# Patient Record
Sex: MALE | Race: WHITE | Employment: OTHER | URBAN - METROPOLITAN AREA
[De-identification: names, ages, dates, MRNs, and addresses within clinical notes are randomized per-mention and may not be internally consistent; named-entity substitution may affect disease eponyms.]

---

## 2022-02-04 ENCOUNTER — TELEPHONE (OUTPATIENT)
Dept: FAMILY MEDICINE CLINIC | Age: 87
End: 2022-02-04

## 2022-02-07 ENCOUNTER — TELEPHONE (OUTPATIENT)
Dept: FAMILY MEDICINE CLINIC | Age: 87
End: 2022-02-07

## 2022-02-07 NOTE — TELEPHONE ENCOUNTER
Authorization to Release Health Information form sent to patient's daughter Luh Neumann via [employer]. This nurse called and informed Ms. Elva Beltran that it has been sent. Will request records from patient's former PCP Dr. Ana Govea. Patient's most recent visit with her was about 3 weeks ago.

## 2022-02-07 NOTE — TELEPHONE ENCOUNTER
Marcela Velázquez called to let you know that she has not received the docusign new patient paperwork yet. She verified her email as:  [email or web address]

## 2022-02-07 NOTE — TELEPHONE ENCOUNTER
Home Based Primary Care & Supportive Services   Phone:  [phone]      Fax:  [phone] [facility], [facility], [address]    Name:  Ruddy Tovar  YOB: 1931    Incoming call from [place] who is inquiring about HBPC services for her 79yo father Facundo Jung. She states that her father recently moved to [place] to live with her. His former PCP is Dr. Thom Summers in [place]. Preliminary Intake Note:     Address:   [address] [place] [id number]    Does patient live within 10 miles of [address] [place] [address] at address listed above?      yes       Distance from [address] [place] [address] office/Travel Time:   0.7 miles/ 3 minutes  Region:  [address] [place] [address] Road:   Medicare A&B with supplement    Does patient qualify based on address and insurance? yes    Region:   Near [facility]    Date of Referral:  2/4/22  Referred By, or How did you hear about our program?   Searched online    Reason for Referral:  Recently moved to [place] to live with daughter    Diagnosis:  Diabetes, orthostatic hypotension with recent falls    Last PCP visit:   1/6/22    Last Hospitalization:   None recent    Nursing Home/Rehab stay in the past year? no    Primary Caregiver:   [place]  Relation to Patient:     Contact Information:  [phone]    Records Needed:   Records requested and received from Dr. Thom Summers    Current Controlled Substances:   Gabapentin 100mg at bedtime    This nurse explained that the [address] [place] [address] team discusses new referrals at our weekly IDG meetings. This nurse will present the referral to the [address] [place] [address] team and will call Ms. Arceo back to notify her of the decision. She voices understanding.                                                                                                                                                                                    Sue Mulligan, JYOTSNAN, RN-BC, [facility] [place]  Referral Navigator, Home Based Primary Care & Supportive Services

## 2022-02-10 ENCOUNTER — TELEPHONE (OUTPATIENT)
Dept: FAMILY MEDICINE CLINIC | Age: 87
End: 2022-02-10

## 2022-02-10 DIAGNOSIS — E11.9 CONTROLLED TYPE 2 DIABETES MELLITUS WITHOUT COMPLICATION, WITHOUT LONG-TERM CURRENT USE OF INSULIN (HCC): Primary | ICD-10-CM

## 2022-02-10 DIAGNOSIS — I95.1 ORTHOSTATIC HYPOTENSION: ICD-10-CM

## 2022-02-10 NOTE — TELEPHONE ENCOUNTER
Radha Reed called to find out the status of the patient's referral to the [facility] program.  She said that she would like the patient to be seen as soon as possible due to the patient experiencing ambulatory difficulties.

## 2022-02-15 ENCOUNTER — TELEPHONE (OUTPATIENT)
Dept: FAMILY MEDICINE CLINIC | Age: 87
End: 2022-02-15

## 2022-02-15 NOTE — TELEPHONE ENCOUNTER
LCSW spoke with pt's dtr, Dee Monge, and scheduled in-home visit to complete HBPC paperwork on Wednesday 2/16 @ 12pm. She works from home and has a break in her day at 12pm to meet with LCSW and sign intake forms on behalf of her father.      DEMARCO Garcia, [facility]    [address]  [address]) [phone]

## 2022-02-16 ENCOUNTER — OFFICE VISIT (OUTPATIENT)
Dept: FAMILY MEDICINE CLINIC | Age: 87
End: 2022-02-16

## 2022-02-16 DIAGNOSIS — Z76.89 ENCOUNTER FOR SOCIAL WORK INTERVENTION: Primary | ICD-10-CM

## 2022-02-16 NOTE — PROGRESS NOTES
Home Based Primary Care   [phone]  Initial Social Work Assessment    Date and Time of Initial In-Home Visit: 2/16/21    Reason for Assessment:  [facility] intake paperwork completion, initial in-home psychosocial assessment    Sources of Information: Pt's dtr, Shiela Villarreal    SOCIAL HISTORY  Relationship Status:   [] Single  []   [] Significant Other  []   [x] /  [] Other:     Living Circumstances: Pt moved in to his daughter's home in [facility] 3 weeks ago. He had previously been living in [address] Dtr moved to [facility] in October 2021. Current/Type of Housing:  [] Public/subsidized housing  [] Apartment, Is there an elevator:   [] Assisted Living  [x] Private House, How many floors: 2    FINANCIAL/INSURANCE  Source of Income:    [x] Social Security   [] SSI   [] Pension   [] Employment Income   [] [address]:   [x] Medicare   [] Medicaid   [x] Private Insurance: [employer] Medicare PPO   [] Other:     Are you having trouble paying for things like rent, food, medications? Not at this time    Is there an agency or person who pays your bills? If yes, who? Pt's dtr is assisting with managing finances    [address]: No concerns    Problem with bed bugs, odors, pests, or pets? Not at this time    Working smoke alarm? [x] Yes [] No    Difficulty getting to exits from the home? There are about 3 steps from front door to the ground level. No challenges noted at this time. SOCIAL SUPPORT ENVIRONMENT  Support System: Pt's dtr, Rox Villafuerte, and her  are pt's primary support, as well as a granddtr who lives in [place] and visits frequently. How often do social supports visit? Frequently, pt lives with dtr and son in law    How do you socialize? When people come to visit and engage with him    Are you involved with any community agencies? Name/Contact: Not at this time    Is or has Adult Protective Services ever been involved? No    In the last 6 months, have you seen a ? Psychiatrist? If yes, they be contacted by [address] team? No    Substance Use:   Tobacco? [] Yes [x] No    Alcohol? [] Yes, How many drinks per week: [x] No   Drugs? [] Yes, which drugs:   [x] No    Do you have an Emergency Call Device system? [] Yes, Which brand? [x] No, Are you interested in obtaining and subscribing to an Emergency Call Device system? No, pt is not left home alone    COGNITIVE/EMOTIONAL   Mental Status: Pt was awake, sitting on the couch watching television when LCSW arrived. He was able to engage in a bit of conversation during today's visit. In the last 6 months, has anyone told you that you are forgetful? Yes    Do you receive help with ADLs? [x] Yes, Who helps you? How many hours/days? Pt has private duty aides during the day  [] No, Do you need help? TRANSPORTATION NEEDS ASSESSMENT  Can you use public transportation? [] Yes [x] No  Do you use transportation services provided by: Managed Care Organization? Community Service Resource? No    EMERGENCY ACTION PLAN  TERESE reviewed the Emergency Action Plan with pt and/or family during this initial in-home Social Work assessment. SW completed Emergency Numbers, reviewed the content areas of Power Loss, Natural Disasters, Clinical Emergencies, Severe Weather, Safety in the Home, and Infection Control. Patient's acuity value consider to be LOW. ADVANCED CARE PLANNING  Pt's dtr, Curt Robison, states that she and her brother (lives in [place], a doctor) both have Power of [place]. Documents not provided, will reach out to request documents for pt's medical record. Narrative:  TEREES visited with pt and dtr in the home. New patient assessment of psychosocial needs and social determinants of health completed. TERESE introduced Home Based Primary Care and Supportive Services and reviewed Emergency Action Plan booklet. Pt moved in with dtr, Curt Robison, and her  about 3 weeks ago.  He was living in [facility]), but Gideon mcdonald had him move in with her due to increased care needs. Gideon mcdonald moved in to her home in October 2021, to be closer to her daughter who lives in [place]. Pt has private duty aides during the day. No acute psychosocial needs noted at this time. LCSW to remain available for support and resources as needed.      Plan:   [x] Establish therapeutic relationship through in home visits and telephonic touch points  [] Assist in optimizing levels of psychosocial function  [] Assess safety concerns and address as appropriate  [x] Assess for caregiver burden and intervene as psychosocially indicated  [] Assess patient for caregiver needs to optimize psychosocial function and safety  [] Provide information on available community resources  [] Initiate conversation regarding health care wishes   [] Assess current Advance Care Planning documents and make ACP recommendations as appropriate  [] Discuss goals of care and treatment preferences  [] Screen for mental health conditions including but not limited to anxiety, depression, and anticipatory grief  [] Offer counseling services for mental health conditions including but not limited to anxiety, depression, and anticipatory grief  [] Engage family in patient health care goals to ensure support for those goals and minimize patient/family conflict  [] Assess cultural needs of patient/family, educate interdisciplinary team and engage appropriate resources    Frequency of Social Work Follow-Up/Visits  [x] As needed  [] Bi-monthly  [] Monthly  [] Weekly  [] Other:    Vadim Harrison MSW, [facility]   Home Based [address] [place] [address]  [phone]

## 2022-02-18 ENCOUNTER — DOCUMENTATION ONLY (OUTPATIENT)
Dept: FAMILY MEDICINE CLINIC | Age: 87
End: 2022-02-18

## 2022-02-18 ENCOUNTER — HOME HEALTH ADMISSION (OUTPATIENT)
Dept: HOME HEALTH SERVICES | Facility: HOME HEALTH | Age: 87
End: 2022-02-18
Payer: MEDICARE

## 2022-02-18 ENCOUNTER — HOME VISIT (OUTPATIENT)
Dept: FAMILY MEDICINE CLINIC | Age: 87
End: 2022-02-18
Payer: MEDICARE

## 2022-02-18 VITALS
RESPIRATION RATE: 16 BRPM | TEMPERATURE: 97.2 F | SYSTOLIC BLOOD PRESSURE: 128 MMHG | HEART RATE: 78 BPM | DIASTOLIC BLOOD PRESSURE: 74 MMHG | OXYGEN SATURATION: 100 %

## 2022-02-18 DIAGNOSIS — Z85.46 HISTORY OF PROSTATE CANCER: ICD-10-CM

## 2022-02-18 DIAGNOSIS — F02.80 ALZHEIMER'S DISEASE (HCC): ICD-10-CM

## 2022-02-18 DIAGNOSIS — Z00.00 MEDICARE ANNUAL WELLNESS VISIT, SUBSEQUENT: ICD-10-CM

## 2022-02-18 DIAGNOSIS — Z91.81 HISTORY OF RECENT FALL: ICD-10-CM

## 2022-02-18 DIAGNOSIS — I10 PRIMARY HYPERTENSION: ICD-10-CM

## 2022-02-18 DIAGNOSIS — Z76.89 ENCOUNTER TO ESTABLISH CARE: Primary | ICD-10-CM

## 2022-02-18 DIAGNOSIS — E53.8 VITAMIN B12 DEFICIENCY: ICD-10-CM

## 2022-02-18 DIAGNOSIS — L89.92 HEALING PRESSURE INJURY, STAGE 2 (HCC): ICD-10-CM

## 2022-02-18 DIAGNOSIS — E89.0 POSTABLATIVE HYPOTHYROIDISM: ICD-10-CM

## 2022-02-18 DIAGNOSIS — Z85.850 HISTORY OF THYROID CANCER: ICD-10-CM

## 2022-02-18 DIAGNOSIS — G30.9 ALZHEIMER'S DISEASE (HCC): ICD-10-CM

## 2022-02-18 DIAGNOSIS — M54.50 CHRONIC LEFT-SIDED LOW BACK PAIN WITHOUT SCIATICA: ICD-10-CM

## 2022-02-18 DIAGNOSIS — H61.23 IMPACTED CERUMEN, BILATERAL: ICD-10-CM

## 2022-02-18 DIAGNOSIS — E55.9 VITAMIN D DEFICIENCY: ICD-10-CM

## 2022-02-18 DIAGNOSIS — G89.29 CHRONIC LEFT-SIDED LOW BACK PAIN WITHOUT SCIATICA: ICD-10-CM

## 2022-02-18 DIAGNOSIS — Z66 DO NOT RESUSCITATE: ICD-10-CM

## 2022-02-18 DIAGNOSIS — E66.3 OVERWEIGHT: ICD-10-CM

## 2022-02-18 DIAGNOSIS — E11.65 TYPE 2 DIABETES MELLITUS WITH HYPERGLYCEMIA, WITHOUT LONG-TERM CURRENT USE OF INSULIN (HCC): ICD-10-CM

## 2022-02-18 DIAGNOSIS — R53.81 DEBILITY: ICD-10-CM

## 2022-02-18 LAB
25(OH)D3 SERPL-MCNC: 41 NG/ML (ref 30–100)
ALBUMIN SERPL-MCNC: 3 G/DL (ref 3.5–5)
ALBUMIN/GLOB SERPL: 0.8 {RATIO} (ref 1.1–2.2)
ALP SERPL-CCNC: 60 U/L (ref 45–117)
ALT SERPL-CCNC: 19 U/L (ref 12–78)
ANION GAP SERPL CALC-SCNC: 9 MMOL/L (ref 5–15)
AST SERPL-CCNC: 8 U/L (ref 15–37)
BILIRUB SERPL-MCNC: 0.7 MG/DL (ref 0.2–1)
BUN SERPL-MCNC: 21 MG/DL (ref 6–20)
BUN/CREAT SERPL: 18 (ref 12–20)
CALCIUM SERPL-MCNC: 9.1 MG/DL (ref 8.5–10.1)
CHLORIDE SERPL-SCNC: 107 MMOL/L (ref 97–108)
CHOLEST SERPL-MCNC: 119 MG/DL
CO2 SERPL-SCNC: 23 MMOL/L (ref 21–32)
CREAT SERPL-MCNC: 1.14 MG/DL (ref 0.7–1.3)
ERYTHROCYTE [DISTWIDTH] IN BLOOD BY AUTOMATED COUNT: 13.5 % (ref 11.5–14.5)
EST. AVERAGE GLUCOSE BLD GHB EST-MCNC: 206 MG/DL
GLOBULIN SER CALC-MCNC: 3.7 G/DL (ref 2–4)
GLUCOSE SERPL-MCNC: 238 MG/DL (ref 65–100)
HBA1C MFR BLD: 8.8 % (ref 4–5.6)
HCT VFR BLD AUTO: 39 % (ref 36.6–50.3)
HDLC SERPL-MCNC: 59 MG/DL
HDLC SERPL: 2 {RATIO} (ref 0–5)
HGB BLD-MCNC: 12.6 G/DL (ref 12.1–17)
LDLC SERPL CALC-MCNC: 44 MG/DL (ref 0–100)
MCH RBC QN AUTO: 31.4 PG (ref 26–34)
MCHC RBC AUTO-ENTMCNC: 32.3 G/DL (ref 30–36.5)
MCV RBC AUTO: 97.3 FL (ref 80–99)
NRBC # BLD: 0 K/UL (ref 0–0.01)
NRBC BLD-RTO: 0 PER 100 WBC
PLATELET # BLD AUTO: 277 K/UL (ref 150–400)
PMV BLD AUTO: 10.3 FL (ref 8.9–12.9)
POTASSIUM SERPL-SCNC: 3.9 MMOL/L (ref 3.5–5.1)
PROT SERPL-MCNC: 6.7 G/DL (ref 6.4–8.2)
PSA SERPL-MCNC: 3.4 NG/ML (ref 0.01–4)
RBC # BLD AUTO: 4.01 M/UL (ref 4.1–5.7)
SODIUM SERPL-SCNC: 139 MMOL/L (ref 136–145)
T4 FREE SERPL-MCNC: 1.4 NG/DL (ref 0.8–1.5)
TRIGL SERPL-MCNC: 80 MG/DL (ref ?–150)
TSH SERPL DL<=0.05 MIU/L-ACNC: 2.55 UIU/ML (ref 0.36–3.74)
VIT B12 SERPL-MCNC: >2000 PG/ML (ref 193–986)
VLDLC SERPL CALC-MCNC: 16 MG/DL
WBC # BLD AUTO: 7.8 K/UL (ref 4.1–11.1)

## 2022-02-18 PROCEDURE — 99345 HOME/RES VST NEW HIGH MDM 75: CPT | Performed by: NURSE PRACTITIONER

## 2022-02-18 PROCEDURE — G8421 BMI NOT CALCULATED: HCPCS | Performed by: NURSE PRACTITIONER

## 2022-02-18 PROCEDURE — G8432 DEP SCR NOT DOC, RNG: HCPCS | Performed by: NURSE PRACTITIONER

## 2022-02-18 PROCEDURE — G0439 PPPS, SUBSEQ VISIT: HCPCS | Performed by: NURSE PRACTITIONER

## 2022-02-18 PROCEDURE — G8536 NO DOC ELDER MAL SCRN: HCPCS | Performed by: NURSE PRACTITIONER

## 2022-02-18 PROCEDURE — G8427 DOCREV CUR MEDS BY ELIG CLIN: HCPCS | Performed by: NURSE PRACTITIONER

## 2022-02-18 PROCEDURE — 1101F PT FALLS ASSESS-DOCD LE1/YR: CPT | Performed by: NURSE PRACTITIONER

## 2022-02-18 RX ORDER — DILTIAZEM HYDROCHLORIDE 120 MG/1
120 CAPSULE, EXTENDED RELEASE ORAL DAILY
COMMUNITY
End: 2022-04-15

## 2022-02-18 RX ORDER — TAMSULOSIN HYDROCHLORIDE 0.4 MG/1
0.4 CAPSULE ORAL DAILY
COMMUNITY
End: 2022-03-29 | Stop reason: SDUPTHER

## 2022-02-18 RX ORDER — LEVOTHYROXINE SODIUM 112 UG/1
112 TABLET ORAL
COMMUNITY
End: 2022-03-29 | Stop reason: SDUPTHER

## 2022-02-18 RX ORDER — LOSARTAN POTASSIUM 25 MG/1
25 TABLET ORAL DAILY
COMMUNITY
End: 2022-03-29 | Stop reason: SDUPTHER

## 2022-02-18 RX ORDER — GABAPENTIN 100 MG/1
100 CAPSULE ORAL
COMMUNITY
End: 2022-03-04 | Stop reason: SDUPTHER

## 2022-02-18 RX ORDER — METFORMIN HYDROCHLORIDE 1000 MG/1
1000 TABLET ORAL 2 TIMES DAILY WITH MEALS
COMMUNITY
End: 2022-03-29 | Stop reason: SDUPTHER

## 2022-02-18 RX ORDER — MENTHOL AND ZINC OXIDE .44; 20.625 G/100G; G/100G
1 OINTMENT TOPICAL
COMMUNITY

## 2022-02-18 RX ORDER — CHOLECALCIFEROL (VITAMIN D3) 125 MCG
1 CAPSULE ORAL DAILY
COMMUNITY
End: 2022-03-04 | Stop reason: SDUPTHER

## 2022-02-18 RX ORDER — ACETAMINOPHEN 325 MG/1
650 TABLET ORAL
COMMUNITY
End: 2022-03-04

## 2022-02-18 RX ORDER — GUAIFENESIN 100 MG/5ML
81 LIQUID (ML) ORAL DAILY
COMMUNITY
End: 2022-09-07 | Stop reason: ALTCHOICE

## 2022-02-18 RX ORDER — MEMANTINE HYDROCHLORIDE 10 MG/1
10 TABLET ORAL 2 TIMES DAILY
COMMUNITY
End: 2022-03-29 | Stop reason: SDUPTHER

## 2022-02-18 RX ORDER — DONEPEZIL HYDROCHLORIDE 10 MG/1
10 TABLET, FILM COATED ORAL
Qty: 30 TABLET | Refills: 0 | Status: SHIPPED | OUTPATIENT
Start: 2022-02-18 | End: 2022-02-24 | Stop reason: SDUPTHER

## 2022-02-18 RX ORDER — ATORVASTATIN CALCIUM 10 MG/1
10 TABLET, FILM COATED ORAL
COMMUNITY
End: 2022-03-29 | Stop reason: SDUPTHER

## 2022-02-18 RX ORDER — LANOLIN ALCOHOL/MO/W.PET/CERES
1000 CREAM (GRAM) TOPICAL DAILY
COMMUNITY
End: 2022-03-04 | Stop reason: ALTCHOICE

## 2022-02-18 NOTE — PROGRESS NOTES
This is the Subsequent Medicare Annual Wellness Exam, performed 12 months or more after the Initial AWV or the last Subsequent AWV    I have reviewed the patient's medical history in detail and updated the computerized patient record. Assessment/Plan   Education and counseling provided:  Are appropriate based on today's review and evaluation  End-of-Life planning (with patient's consent)  Prostate cancer screening tests (PSA, covered annually)  Diabetes outpatient self-management training services  Covid-19 vaccine    1. Encounter to establish care  2. Type 2 diabetes mellitus with hyperglycemia, without long-term current use of insulin (HCC)  -     HEMOGLOBIN A1C WITH EAG; Future  -     METABOLIC PANEL, COMPREHENSIVE; Future  -     LIPID PANEL; Future  3. Alzheimer's disease ([facility]  -     CBC W/O DIFF; Future  -     donepeziL (ARICEPT) 10 mg tablet; Take 1 Tablet by mouth nightly., Normal, Disp-30 Tablet, R-0  4. Primary hypertension  -     CBC W/O DIFF; Future  5. History of prostate cancer  -     PSA, DIAGNOSTIC (PROSTATE SPECIFIC AG); Future  6. History of thyroid cancer  7. Postablative hypothyroidism  -     TSH 3RD GENERATION; Future  -     T4, FREE; Future  8. Vitamin B12 deficiency  -     VITAMIN B12; Future  9. Vitamin D deficiency  -     VITAMIN D, 25 HYDROXY; Future  10. Overweight  11. Impacted cerumen, bilateral  -     carbamide peroxide (DEBROX) 6.5 % otic solution; Administer 5 Drops into each ear two (2) times a day., Normal, Disp-30 mL, R-0  12. Debility  -     REFERRAL TO HOME HEALTH  13. History of recent fall  -     [address] [employer]  14. Chronic left-sided low back pain without sciatica  15.  Healing pressure injury, stage 2 (HCC)       Depression Risk Factor Screening     3 most recent PHQ Screens 2/18/2022   Little interest or pleasure in doing things Not at all   Feeling down, depressed, irritable, or hopeless Not at all   Total Score PHQ 2 0       Alcohol & Drug Abuse Risk Screen Do you average more than 1 drink per night or more than 7 drinks a week: No    In the past three months have you have had more than 4 drinks containing alcohol on one occasion: No          Functional Ability and Level of Safety    Hearing: The patient wears hearing aids. Significant impacted cerumen bilaterally      Activities of Daily Living: The home contains: handrails, grab bars and shower chair  Patient needs help with:  transportation, shopping, preparing meals, laundry, housework, managing medications, managing money, dressing, bathing, hygiene, bathroom needs and walking      Ambulation: with difficulty, uses a rolling walker     Fall Risk:  Fall Risk Assessment, last 12 mths 2/18/2022   Able to walk? Yes   Fall in past 12 months? 1   Do you feel unsteady? 1   Are you worried about falling 1   Is TUG test greater than 12 seconds? 1   Is the gait abnormal? 1   Number of falls in past 12 months 2   Fall with injury?  0      Abuse Screen:  Patient is not abused       Cognitive Screening    Has your family/caregiver stated any concerns about your memory: yes - known diagnosis of Alzheimer's dementia     Cognitive Screening: known diagnosis of Alzheimer's dementia    Health Maintenance Due     Health Maintenance Due   Topic Date Due    COVID-19 Vaccine (1) Never done    DTaP/Tdap/Td series (1 - Tdap) Never done    Shingrix Vaccine Age 50> (1 of 2) Never done    Pneumococcal 65+ years (1 of 1 - PPSV23) Never done    Flu Vaccine (1) Never done    Medicare Yearly Exam  02/17/2022       Patient Care Team   Patient Care Team:  Khari Anaya NP as PCP - General (Nurse Practitioner)    History     Patient Active Problem List   Diagnosis Code    Type 2 diabetes mellitus with hyperglycemia, without long-term current use of insulin (HCC) E11.65    Alzheimer's disease ([facility] G30.9, F02.80    Primary hypertension I10    History of prostate cancer Z85.46    History of thyroid cancer Z85.850    Postablative hypothyroidism E89.0    Vitamin B12 deficiency E53.8    Vitamin D deficiency E55.9    Overweight E66.3    Chronic left-sided low back pain without sciatica M54.50, G89.29     Past Medical History:   Diagnosis Date    Alzheimer's dementia without behavioral disturbance ([facility]     Cancer ([facility] 2002    prostate - treated with radiation    Diabetes ([facility]     Hypertension     Thyroid cancer ([facility] 1998      Past Surgical History:   Procedure Laterality Date    HX ROTATOR CUFF REPAIR  1990     Current Outpatient Medications   Medication Sig Dispense Refill    carbamide peroxide (DEBROX) 6.5 % otic solution Administer 5 Drops into each ear two (2) times a day. 30 mL 0    donepeziL (ARICEPT) 10 mg tablet Take 1 Tablet by mouth nightly. 30 Tablet 0    menthol-zinc oxide (CALMOSEPTINE) 0.44-20.6 % oint Apply 1 Each to affected area. Apply thin Layer to Sacral area as needed and after incontinence care   Indications: skin irritation      cholecalciferol, vitamin D3, (Vitamin D3) 50 mcg (2,000 unit) tab Take 1 Tablet by mouth daily.  acetaminophen (TYLENOL) 325 mg tablet Take 650 mg by mouth every eight (8) hours as needed for Pain. Indications: pain associated with arthritis, backache      cyanocobalamin (Vitamin B-12) 1,000 mcg tablet Take 1,000 mcg by mouth daily. Indications: prevention of vitamin B12 deficiency      tamsulosin (Flomax) 0.4 mg capsule Take 0.4 mg by mouth daily. Indications: enlarged prostate with urination problem      metFORMIN (GLUCOPHAGE) 1,000 mg tablet Take 1,000 mg by mouth two (2) times daily (with meals).  memantine (NAMENDA) 10 mg tablet Take 10 mg by mouth two (2) times a day. Indications: moderate to severe Alzheimer's type dementia      losartan (COZAAR) 25 mg tablet Take 25 mg by mouth daily. Indications: high blood pressure      levothyroxine (SYNTHROID) 112 mcg tablet Take 112 mcg by mouth Daily (before breakfast).  Indications: additional treatment for thyroid cancer      gabapentin (NEURONTIN) 100 mg capsule Take 100 mg by mouth nightly. Indications: diabetic complication causing injury to some body nerves, neuropathic pain      dilTIAZem ER (DILACOR XR) 120 mg capsule Take 120 mg by mouth daily. Indications: high blood pressure      atorvastatin (LIPITOR) 10 mg tablet Take 10 mg by mouth nightly. Indications: excessive fat in the blood      aspirin 81 mg chewable tablet Take 81 mg by mouth daily. Indications: stroke prevention       No Known Allergies    Family History   Problem Relation Age of Onset    Parkinson's Disease Mother     No Known Problems Father     Pancreatic Cancer Sister      Social History     Tobacco Use    Smoking status: Former Smoker     Packs/day: 1.00     Years: 40.00     Pack years: 40.00     Types: Cigarettes     Start date:      Quit date:      Years since quittin.1    Smokeless tobacco: Never Used   Substance Use Topics    Alcohol use:  Yes     Alcohol/week: 7.0 standard drinks     Types: 7 Glasses of wine per week         Cecilia Ramsey NP

## 2022-02-18 NOTE — PATIENT INSTRUCTIONS

## 2022-02-18 NOTE — ACP (ADVANCE CARE PLANNING)
Advance Care Planning     Advance Care Planning (ACP) Physician/NP/PA Conversation      Date of Conversation: 2/18/2022  Conducted with: Healthcare Decision Maker: Named in Advance Directive or Healthcare Power of  and patient    Healthcare Decision Maker:     Primary Decision Maker: Aria Garcia - Daughter - [phone]  Click here to [address] including selection of the Healthcare Decision Maker Relationship (ie \"Primary\")        Today we documented Decision Maker(s). The patient will provide ACP documents. Care Preferences:    Hospitalization: \"If your health worsens and it becomes clear that your chance of recovery is unlikely, what would be your preference regarding hospitalization? \"  The patient and his daughter/Pacheco Cramer report that he would go to the hospital only \"if I was bleeding or had a broken bone. \"    Ventilation: \"If you were unable to breathe on your own and your chance of recovery was unlikely, what would be your preference about the use of a ventilator (breathing machine) if it was available to you? \"   The patient would NOT desire the use of a ventilator. Resuscitation: \"In the event your heart stopped as a result of an underlying serious health condition, would you want attempts to be made to restart your heart, or would you prefer a natural death? \"   No, do NOT attempt to resuscitate.     Additional topics discussed: treatment goals, benefit/burden of treatment options and hospitalization preferences    Conversation Outcomes / Follow-Up Plan:   ACP in process - completing/providing documents   Reviewed DNR/DNI and patient elects DNR order - completed portable DNR form & placed order     Length of Voluntary ACP Conversation in minutes:  <16 minutes (Non-Billable)    Tommy Cameron NP

## 2022-02-18 NOTE — PROGRESS NOTES
Home Based [address] [facility], [place]   [id number]      Date of Current Visit: 22       SUMMARY:   Janny Rodney is a 80 y.o. who was referred by his daughter, Vish Larose. I have reviewed the RN Referral Intake Note Antonio Davis) which includes information regarding the patient's health, caregiving and social determinants. Home visit location/info:  Enter through main door; daughter Tyler Reyes and her  work from home; caregivers ([place] benefits) Monday - Saturday, 8:30 - 12:30 (Carmen CARSON, someone else on ). Social history:  Lives with his daughter, Tyler Reyes, and her  in a house in the [place] [address]. Retired [employer] . Wife  in [address] [place] [address], and he was living in an assisted living facility in [place] until 2022, when he was moved in to a main-floor suite in his daughter's house in [place] to be closer to family. GOALS OF CARE / TREATMENT PREFERENCES:     Advance Care Planning:    Primary Decision Maker (Active): Sharifa Rodasprema - Daughter - [phone]  No flowsheet data found. Per daughter, Tyler Reyes, she and her brother (who lives in [place] and is a PCP) share POA for their dad. She also reports that he has a DDNR; she will get us copies of these for our records prior to our next visit. Per Tyler Reyes, he wishes to only go to the hospital \"if I'm bleeding or have a broken bone. \" [facility] is their preferred hospital.     Code Status:  [] Attempt Resuscitation       [x] Do Not Attempt Resuscitation       ASSESSMENT AND PLAN       ICD-10-CM ICD-9-CM    1. Encounter to establish care  Z76.89 V65.8    2. Type 2 diabetes mellitus with hyperglycemia, without long-term current use of insulin ([facility])  E11.65 250.00 HEMOGLOBIN A1C WITH EAG     582.00 METABOLIC PANEL, COMPREHENSIVE      LIPID PANEL   3. Alzheimer's disease ([facility] [address]  G30.9 331.0 CBC W/O DIFF    F02.80  donepeziL (ARICEPT) 10 mg tablet   4.  Primary hypertension  I10 401.9 CBC W/O DIFF   5. History of prostate cancer  Z85.46 V10.46 PSA, DIAGNOSTIC (PROSTATE SPECIFIC AG)   6. History of thyroid cancer  Z85.850 V10.87    7. Postablative hypothyroidism  E89.0 244.1 TSH 3RD GENERATION      T4, FREE   8. Vitamin B12 deficiency  E53.8 266.2 VITAMIN B12   9. Vitamin D deficiency  E55.9 268.9 VITAMIN D, 25 HYDROXY   10. Overweight  E66.3 278.02    11. Impacted cerumen, bilateral  H61.23 380.4 carbamide peroxide (DEBROX) 6.5 % otic solution   12. Debility  R53.81 799.3 REFERRAL TO HOME HEALTH   13. History of recent fall  Z91.81 V15.88 REFERRAL TO HOME HEALTH   14. Chronic left-sided low back pain without sciatica  M54.50 724.2     G89.29 338.29    15. Healing pressure injury, stage 2 (HCC)  L89.92 707.00      707.22    16. Medicare annual wellness visit, subsequent  Z00.00 V70.0    17. Do not resuscitate  Z66 V49.86 DO NOT RESUSCITATE       Type 2 diabetes - We will check HgA1C today. Currently taking metformin 1000mg BID; may need to adjust medication regimen, depending on lab results. Gabapentin 100mg QHS controlling his neuropathic pain; continue. Will attempt to collect microalbumin at next visit, as well as leave information for in-home optometry services. Will determine need for regular blood glucose checks once labs result, as frequent glucose checks may cause distress, given his dementia. Will consider adjustment/discontinuation of atorvastatin, depending on lab results. Will discuss diabetic diet as well. Normal foot exam today. Alzheimer's Disease - Currently maintained on donepezil 10mg daily and namenda 10mg. No disturbing behaviors. Discussed allowing him to do as much for himself as possible; will continue to monitor and consider comprehensive cognitive assessment in the future. Hypertension - Blood pressure under excellent control today on diltiazem ER 120mg daily and losartan 25mg daily. No changes today.    Hx of thyroid cancer/postablative hypothyroidism - Will check TSH and FT4 today, with adjustment in levothyroxine dosing as indicated. Hx of prostate CA - PSA checked today. Vitamin deficiencies - Suspect that these may be blanket orders from the [facility]. Will check vit D and vit B12 levels today and continue these medications if indicated. Impacted cerumen, bilateral - Will start debrox drops nightly. Will plan for irrigation & curettage at next visit, should this be required. Debility/history of recent fall - Will refer to [facility] for strengthening. Discussed the importance of maintaining as much function as possible. Stage II Pressure Ulcer - Appears to be resolving. Continue calmoseptine and offloading pressure as much as possible. Will recheck at next visit. Chronic lumbar back pain - No known injury, has been present for years. No abnormalities on basic exam today. Will start with tylenol 500mg BID as needed for pain. Encouraged frequent movement/ambulation as well, as this appears to help. Should this not provide relief, will consider further workup, to include X-ray. Labs drawn today. Will plan to follow up in 2 weeks for lab review and assessment. I have asked the daughter to have a copy of his Covid-19 card for our review/upload and his AMD, POA, and DNR for our review. Health Maintenance Due   Topic Date Due    COVID-19 Vaccine (1) Never done    DTaP/Tdap/Td series (1 - Tdap) Never done    Shingrix Vaccine Age 50> (1 of 2) Never done    Pneumococcal 65+ years (1 of 1 - PPSV23) Never done    Flu Vaccine (1) Never done         Patient/family was provided a paper after visit summary prior to conclusion of visit. HISTORY:      CHIEF COMPLAINT:    Chief Complaint   Patient presents with    Establish Care       HPI/SUBJECTIVE:    The patient is: [x] Verbal / [] Nonverbal     The patient is seen today in his home due to taxing effort to seek primary care services in the community.      Mr. Wenda Harada is a retired [employer] , who moved to [place] in January  to live with his daughter, Diamond Young. He was previously living in an assisted living facility in [place] for approximately 4 years, after his wife . He was unable to care for himself so moved to the assisted living facility, and was moved to [facility] to be closer to his daughter and her family. He has 4 hours of caregiving each day through his VA benefits, which his daughter Diamond Young reports today is plenty. He ambulates around the home with a rolling walker, and uses a wheelchair if he goes out, which is usually about every 2 weeks to go to a restaurant. He has had 2 falls in the past year, but none with injury. He is reportedly fully vaccinated against Covid-19, including a booster, but his card is at the  [facility]; his daughter will get this for our records. A typical day includes him waking up around 8:30am, showering with help from his aide, and eating breakfast. He'll sit in the living room and watch television until lunch, then nap for about an hour. He eats dinner around 6:30pm and goes to bed around 8/9pm, sleeping well through the night. He has a history of hypertension, currently taking diltiazem ER 120mg and losartan 25mg daily. His daughter denies history of MI or stroke. Home blood pressures are not routinely checked. He has a history of prostate cancer, diagnosed in approximately , for which he did radiation therapy. He has a history of thyroid cancer, diagnosed in approximately . He had thyroid ablation and currently takings levothyroxine 112mcg daily. He has type 2 diabetes with diabetic neuropathy. He takes metformin 1000mg BID, recently increased, and takes gabapentin 100mg QHS for neuropathic pain, which is effective. Blood sugars are not routinely checked, and they do not have a functioning glucometer. He has not had an eye exam in approximately 4 years.  He reportedly [facility], and his blood sugar at that time (30 minutes post-prandial) was 420 and he had glucosuria. He is on ASA 81mg daily and atorvastatin 10mg daily. He also has Alzheimer's disease, diagnosed in approximately 2014. He takes donepezil 10mg nightly along with namenda 10mg. His daughter reports that he does not have any behavior disturbances and sleeps well, describing his symptoms as \"pleasantly confused. \"    He is incontinent of urine and stool, but is able to change his brief with some assistance and prompting. He is able to feed himself when food is prepared for him and has a hearty appetite. He sleeps well. His daughter and son reportedly share POA (both medical and financial); she will get us these documents. His daughter has 3 concerns today:  1 - He has been complaining of a sore back. This is chronic and there has been no known injury. They have not tried anything for the symptoms. With prompting, they are able to identify that this pain is worse when he is more sedentary. 2 - He has a sore on his buttocks that they would like for me to look at today. It has been present since he arrived at his daughter's home about 3 weeks ago and has been improving. They have been applying calmoseptine to the site and using a cushion to help offload pressure. 3 - He saw an audiologist about 6 months ago, and he wears bilateral hearing aids. However, they were told that he had significant earwax buildup and he is unable to wear the right hearing aid. He has difficulty with hearing as a result of this.      REVIEW OF SYSTEMS:     ROS  Constitutional: denies activity change, appetite change, fatigue, fever  HEENT: denies congestion, sinus pressure, sore throat; endorses hearing loss  CV: denies chest pain, palpitations, edema  Respiratory: denies shortness of breath, wheezing  GI: denies abdominal pain, blood in stool, diarrhea, constipation  MSK: denies arthralgias, joint swelling; endorses chronic left-sided lumbar back pain  Neuro: denies frequent headaches, dizziness, numbness/tingling  Skin: endorses left-sided buttock \"sore\"  Psych: denies depression, anxiety, confusion, endorses chronic memory changes         FUNCTIONAL ASSESSMENT:     Palliative Performance Scale (PPS): 50     PSYCHOSOCIAL/SPIRITUAL SCREENING:      Any spiritual / Hindu concerns: [] Yes /  [x] No     Caregiver Burnout: [] Yes /  [x] No /  [] No Caregiver Present       PHYSICAL EXAM:     Visit Vitals  /74 (BP 1 Location: Left upper arm, BP Patient Position: Sitting)   Pulse 78   Temp 97.2 °F (36.2 °C) (Temporal)   Resp 16   SpO2 100%       Physical Exam    Constitutional: no acute distress, pleasant, well-groomed  male; overweight; ambulates with rollator, moderately unsteady  HEENT: normocephalic, atraumatic, external ears normal bilaterally; moist mucous membranes; EOM intact, PERRL; large amount of impacted cerumen bilaterally, unable to visualize eardrum  CV: regular rate and rhythm, no murmurs noted  Pulm: normal effort, normal breath sounds without wheezing or rhonchi  Abd: normal bowel sounds, soft, non-tender  : deferred  Skin: warm, dry; nickel-sized area of erythema and broken skin on inner left buttock, some granulation tissue  MSK: normal inspection and palpation of lumbar spine, identifying left lumbar paraspinal muscles as location of pain  Neuro: A & O x 2; mental status at baseline; remote memory intact  Psych: mood and behavior normal     Diabetic foot exam completed. Pedal pulses 1+ bilaterally. Sensation intact to light touch. Monofilament exam normal bilaterally. No sores or tinea noted.         HISTORY:     Past Medical and Surgical History reviewed in [address] [place] [address] on date of initial visit    Patient Active Problem List   Diagnosis Code    Type 2 diabetes mellitus with hyperglycemia, without long-term current use of insulin ([facility]) E11.65    Alzheimer's disease ([facility] [address] G30.9, F02.80    Primary hypertension I10    History of prostate cancer Z85.46    History of thyroid cancer Z85.850    Postablative hypothyroidism E89.0    Vitamin B12 deficiency E53.8    Vitamin D deficiency E55.9    Overweight E66.3    Chronic left-sided low back pain without sciatica M54.50, G89.29     Family History   Problem Relation Age of Onset    Parkinson's Disease Mother     No Known Problems Father     Pancreatic Cancer Sister       Social History     Tobacco Use    Smoking status: Former Smoker     Packs/day: 1.00     Years: 40.00     Pack years: 40.00     Types: Cigarettes     Start date:      Quit date:      Years since quittin.1    Smokeless tobacco: Never Used   Substance Use Topics    Alcohol use: Yes     Alcohol/week: 7.0 standard drinks     Types: 7 Glasses of wine per week     No Known Allergies   Current Outpatient Medications   Medication Sig    carbamide peroxide (DEBROX) 6.5 % otic solution Administer 5 Drops into each ear two (2) times a day.  donepeziL (ARICEPT) 10 mg tablet Take 1 Tablet by mouth nightly.  menthol-zinc oxide (CALMOSEPTINE) 0.44-20.6 % oint Apply 1 Each to affected area. Apply thin Layer to Sacral area as needed and after incontinence care   Indications: skin irritation    cholecalciferol, vitamin D3, (Vitamin D3) 50 mcg (2,000 unit) tab Take 1 Tablet by mouth daily.  acetaminophen (TYLENOL) 325 mg tablet Take 650 mg by mouth every eight (8) hours as needed for Pain. Indications: pain associated with arthritis, backache    cyanocobalamin (Vitamin B-12) 1,000 mcg tablet Take 1,000 mcg by mouth daily. Indications: prevention of vitamin B12 deficiency    tamsulosin (Flomax) 0.4 mg capsule Take 0.4 mg by mouth daily. Indications: enlarged prostate with urination problem    metFORMIN (GLUCOPHAGE) 1,000 mg tablet Take 1,000 mg by mouth two (2) times daily (with meals).  memantine (NAMENDA) 10 mg tablet Take 10 mg by mouth two (2) times a day.  Indications: moderate to severe Alzheimer's type dementia    losartan (COZAAR) 25 mg tablet Take 25 mg by mouth daily. Indications: high blood pressure    levothyroxine (SYNTHROID) 112 mcg tablet Take 112 mcg by mouth Daily (before breakfast). Indications: additional treatment for thyroid cancer    gabapentin (NEURONTIN) 100 mg capsule Take 100 mg by mouth nightly. Indications: diabetic complication causing injury to some body nerves, neuropathic pain    dilTIAZem ER (DILACOR XR) 120 mg capsule Take 120 mg by mouth daily. Indications: high blood pressure    atorvastatin (LIPITOR) 10 mg tablet Take 10 mg by mouth nightly. Indications: excessive fat in the blood    aspirin 81 mg chewable tablet Take 81 mg by mouth daily. Indications: stroke prevention     No current facility-administered medications for this visit.         LAB DATA REVIEWED:     No results found for: HBA1C, DVB9PUEP, EPW0QYXM, DUJ8KXBJ  No results found for: MCACR, MCA1, MCA2, MCA3, MCAU, MCAU2, MCALPOCT  No results found for: TSH, TSH2, TSH3, TSHP, TSHEXT, TSHEXT  No results found for: VITD3, XQVID2, XQVID3, XQVID, VD3RIA      No results found for: WBC, HGB, PLT, HGBEXT, PLTEXT, HGBEXT, PLTEXT  No results found for: NA, K, CL, CO2, BUN, CREA, CA, MG, PHOS   No results found for: AP, TBIL, TP, ALB, GLOB, GGT  No results found for: IRON, FE, TIBC, IBCT, PSAT, FERR             Cecilia Braulio, NP

## 2022-02-18 NOTE — PROGRESS NOTES
Home Based Primary Care  Plan of Care    [facility] [place] Team Members: Janneth Pardo MD; Sue Padgett NP; Wolf Boykin NP; Cherylene Biles, RN; Hector Shell, RN; Gretel Bui, HUONG; SUDHA Tai  10/7/1931 / [id number]  male    The physician has reviewed and discussed the plan of care with the interdisciplinary group on 02/22/22 and agrees. Date of Initial Visit (Start of Care): 2/18/2022    Diagnosis:   Patient Active Problem List   Diagnosis Code    Type 2 diabetes mellitus with hyperglycemia, without long-term current use of insulin (HCC) E11.65    Alzheimer's disease ([facility] [address] G30.9, F02.80    Primary hypertension I10    History of prostate cancer Z85.46    History of thyroid cancer Z85.850    Postablative hypothyroidism E89.0    Vitamin B12 deficiency E53.8    Vitamin D deficiency E55.9    Overweight E66.3    Chronic left-sided low back pain without sciatica M54.50, G89.29       Patient status summary: 80 y.o. male who was referred to the [facility] program due to taxing effort to seek primary care services in the community. Patient discussed today for initial POC review. Advance Care Planning:  Code status: DNR      Primary Decision Maker (Active): Alicia Parr - Daughter - [phone]   No flowsheet data found. DME/Supplies:  Rolling walker     No Known Allergies    Nutritional Requirements:   Oral with supported meal preparation    Functional/Activity Level:  Ambulatory with assistance of cane, walker, or support of another person (significant impairment)    Safety Measures:   Fall risk and Self-care deficity    Acuity Level Rating: Low    Current Outpatient Medications   Medication Sig    carbamide peroxide (DEBROX) 6.5 % otic solution Administer 5 Drops into each ear two (2) times a day.  donepeziL (ARICEPT) 10 mg tablet Take 1 Tablet by mouth nightly.  menthol-zinc oxide (CALMOSEPTINE) 0.44-20.6 % oint Apply 1 Each to affected area.  Apply thin Layer to Sacral area as needed and after incontinence care   Indications: skin irritation    cholecalciferol, vitamin D3, (Vitamin D3) 50 mcg (2,000 unit) tab Take 1 Tablet by mouth daily.  acetaminophen (TYLENOL) 325 mg tablet Take 650 mg by mouth every eight (8) hours as needed for Pain. Indications: pain associated with arthritis, backache    cyanocobalamin (Vitamin B-12) 1,000 mcg tablet Take 1,000 mcg by mouth daily. Indications: prevention of vitamin B12 deficiency    tamsulosin (Flomax) 0.4 mg capsule Take 0.4 mg by mouth daily. Indications: enlarged prostate with urination problem    metFORMIN (GLUCOPHAGE) 1,000 mg tablet Take 1,000 mg by mouth two (2) times daily (with meals).  memantine (NAMENDA) 10 mg tablet Take 10 mg by mouth two (2) times a day. Indications: moderate to severe Alzheimer's type dementia    losartan (COZAAR) 25 mg tablet Take 25 mg by mouth daily. Indications: high blood pressure    levothyroxine (SYNTHROID) 112 mcg tablet Take 112 mcg by mouth Daily (before breakfast). Indications: additional treatment for thyroid cancer    gabapentin (NEURONTIN) 100 mg capsule Take 100 mg by mouth nightly. Indications: diabetic complication causing injury to some body nerves, neuropathic pain    dilTIAZem ER (DILACOR XR) 120 mg capsule Take 120 mg by mouth daily. Indications: high blood pressure    atorvastatin (LIPITOR) 10 mg tablet Take 10 mg by mouth nightly. Indications: excessive fat in the blood    aspirin 81 mg chewable tablet Take 81 mg by mouth daily. Indications: stroke prevention     No current facility-administered medications for this visit. The Plan of Care has been initiated for within 15 days of New Visit  and reviewed and updated by the Interdisciplinary Group (IDG) as frequently as the patient condition warrants. Plan of Care by Discipline:    1.  Provider  Identify patient's health care goal(s), Ongoing evaluation of treatment interventions for specific disease state, Assessment of medication adverse effects, Reduction of polypharmacy within benefit/burden framework appropriate to age, function and disease state, Determine need for laboratory assessment based on patient disease status , Assess results of laboratory testing and adjust treatment plan as appropriate, Assess current Advance Care Planning documents and make ACP recommendations as appropriate, Discuss goals of care and treatment preferences, including resuscitation and Assess for Home Health and order as medically indicated    2. Nursing  Introduce Home Based Primary Care and Supportive Services, Review Emergency Preparedness Plan, Review Health Maintenance with patient and provider; update as appropriate, Education for skin care in patients with limited mobility; with focus on preventing skin breakdown and Provider caregiver / family support for patient's current and changing condition    3. Social Work  New patient assessment of psychosocial needs and social determinants of health, Assist in optimizing levels of psychosocial function, Assess patient for caregiver needs to optimize psychosocial function and safety, Provide information on available community resources and Assess current Advance Care Planning documents and make ACP recommendations as appropriate      Plan of Care Orders / Action Items:    Type 2 diabetes - We will check HgA1C today. Currently taking metformin 1000mg BID; may need to adjust medication regimen, depending on lab results. Gabapentin 100mg QHS controlling his neuropathic pain; continue. Will attempt to collect microalbumin at next visit, as well as leave information for in-home optometry services. Will determine need for regular blood glucose checks once labs result, as frequent glucose checks may cause distress, given his dementia. Will consider adjustment/discontinuation of atorvastatin, depending on lab results. Will discuss diabetic diet as well. Normal foot exam today.   Alzheimer's Disease - Currently maintained on donepezil 10mg daily and namenda 10mg. No disturbing behaviors. Discussed allowing him to do as much for himself as possible; will continue to monitor and consider comprehensive cognitive assessment in the future. Hypertension - Blood pressure under excellent control today on diltiazem ER 120mg daily and losartan 25mg daily. No changes today. Hx of thyroid cancer/postablative hypothyroidism - Will check TSH and FT4 today, with adjustment in levothyroxine dosing as indicated. Hx of prostate CA - PSA checked today. Vitamin deficiencies - Suspect that these may be blanket orders from the [facility]. Will check vit D and vit B12 levels today and continue these medications if indicated. Impacted cerumen, bilateral - Will start debrox drops nightly. Will plan for irrigation & curettage at next visit, should this be required. Debility/history of recent fall - Will refer to [facility] for strengthening. Discussed the importance of maintaining as much function as possible. Stage II Pressure Ulcer - Appears to be resolving. Continue calmoseptine and offloading pressure as much as possible. Will recheck at next visit. Chronic lumbar back pain - No known injury, has been present for years. No abnormalities on basic exam today. Will start with tylenol 500mg BID as needed for pain. Encouraged frequent movement/ambulation as well, as this appears to help. Should this not provide relief, will consider further workup, to include X-ray. Medicare AWV - Completed today; see separate note.      Labs drawn today. Will plan to follow up in 2 weeks for lab review and assessment. I have asked the daughter to have a copy of his Covid-19 card for our review/upload and his AMD, POA, and DNR for our review.      Health Maintenance   Topic Date Due    COVID-19 Vaccine (1) Never done    DTaP/Tdap/Td series (1 - Tdap) Never done    Shingrix Vaccine Age 50> (1 of 2) Never done    Pneumococcal 65+ years (1 of 1 - PPSV23) Never done    Flu Vaccine (1) Never done    Depression Screen  02/18/2023    Lipid Screen  02/18/2023    Medicare Yearly Exam  02/19/2023         Estimated Visit Frequency:  Every 2 month Provider Visit  Last MD visit:   SW visits PRN

## 2022-02-23 ENCOUNTER — HOME CARE VISIT (OUTPATIENT)
Dept: SCHEDULING | Facility: HOME HEALTH | Age: 87
End: 2022-02-23
Payer: MEDICARE

## 2022-02-23 PROCEDURE — 400013 HH SOC

## 2022-02-23 PROCEDURE — G0151 HHCP-SERV OF PT,EA 15 MIN: HCPCS

## 2022-02-24 VITALS
SYSTOLIC BLOOD PRESSURE: 118 MMHG | DIASTOLIC BLOOD PRESSURE: 66 MMHG | RESPIRATION RATE: 17 BRPM | HEART RATE: 70 BPM | OXYGEN SATURATION: 98 % | TEMPERATURE: 98.6 F

## 2022-02-24 DIAGNOSIS — F02.80 ALZHEIMER'S DISEASE (HCC): ICD-10-CM

## 2022-02-24 DIAGNOSIS — G30.9 ALZHEIMER'S DISEASE (HCC): ICD-10-CM

## 2022-02-24 RX ORDER — DONEPEZIL HYDROCHLORIDE 10 MG/1
10 TABLET, FILM COATED ORAL
Qty: 30 TABLET | Refills: 0 | Status: SHIPPED | OUTPATIENT
Start: 2022-02-24 | End: 2022-03-28

## 2022-02-28 ENCOUNTER — HOME CARE VISIT (OUTPATIENT)
Dept: SCHEDULING | Facility: HOME HEALTH | Age: 87
End: 2022-02-28
Payer: MEDICARE

## 2022-02-28 VITALS
RESPIRATION RATE: 16 BRPM | SYSTOLIC BLOOD PRESSURE: 116 MMHG | DIASTOLIC BLOOD PRESSURE: 70 MMHG | OXYGEN SATURATION: 96 % | HEART RATE: 57 BPM | TEMPERATURE: 98.4 F

## 2022-02-28 PROCEDURE — G0157 HHC PT ASSISTANT EA 15: HCPCS

## 2022-03-03 ENCOUNTER — HOME CARE VISIT (OUTPATIENT)
Dept: SCHEDULING | Facility: HOME HEALTH | Age: 87
End: 2022-03-03
Payer: MEDICARE

## 2022-03-03 PROCEDURE — G0157 HHC PT ASSISTANT EA 15: HCPCS

## 2022-03-04 ENCOUNTER — HOME VISIT (OUTPATIENT)
Dept: FAMILY MEDICINE CLINIC | Age: 87
End: 2022-03-04
Payer: MEDICARE

## 2022-03-04 VITALS
HEART RATE: 73 BPM | OXYGEN SATURATION: 96 % | WEIGHT: 175.1 LBS | TEMPERATURE: 96.7 F | SYSTOLIC BLOOD PRESSURE: 106 MMHG | DIASTOLIC BLOOD PRESSURE: 58 MMHG | RESPIRATION RATE: 20 BRPM

## 2022-03-04 VITALS
DIASTOLIC BLOOD PRESSURE: 75 MMHG | TEMPERATURE: 97.7 F | HEART RATE: 72 BPM | RESPIRATION RATE: 18 BRPM | OXYGEN SATURATION: 99 % | SYSTOLIC BLOOD PRESSURE: 132 MMHG

## 2022-03-04 DIAGNOSIS — E55.9 VITAMIN D DEFICIENCY: ICD-10-CM

## 2022-03-04 DIAGNOSIS — E11.65 TYPE 2 DIABETES MELLITUS WITH HYPERGLYCEMIA, WITHOUT LONG-TERM CURRENT USE OF INSULIN (HCC): Primary | ICD-10-CM

## 2022-03-04 DIAGNOSIS — M54.50 CHRONIC LEFT-SIDED LOW BACK PAIN WITHOUT SCIATICA: ICD-10-CM

## 2022-03-04 DIAGNOSIS — Z85.850 HISTORY OF THYROID CANCER: ICD-10-CM

## 2022-03-04 DIAGNOSIS — I10 PRIMARY HYPERTENSION: ICD-10-CM

## 2022-03-04 DIAGNOSIS — Z85.46 HISTORY OF PROSTATE CANCER: ICD-10-CM

## 2022-03-04 DIAGNOSIS — G30.9 ALZHEIMER'S DISEASE (HCC): ICD-10-CM

## 2022-03-04 DIAGNOSIS — H61.23 IMPACTED CERUMEN OF BOTH EARS: ICD-10-CM

## 2022-03-04 DIAGNOSIS — E89.0 POSTABLATIVE HYPOTHYROIDISM: ICD-10-CM

## 2022-03-04 DIAGNOSIS — G89.29 CHRONIC LEFT-SIDED LOW BACK PAIN WITHOUT SCIATICA: ICD-10-CM

## 2022-03-04 DIAGNOSIS — F02.80 ALZHEIMER'S DISEASE (HCC): ICD-10-CM

## 2022-03-04 PROCEDURE — 99350 HOME/RES VST EST HIGH MDM 60: CPT | Performed by: NURSE PRACTITIONER

## 2022-03-04 PROCEDURE — 69210 REMOVE IMPACTED EAR WAX UNI: CPT | Performed by: NURSE PRACTITIONER

## 2022-03-04 PROCEDURE — G8536 NO DOC ELDER MAL SCRN: HCPCS | Performed by: NURSE PRACTITIONER

## 2022-03-04 PROCEDURE — G8427 DOCREV CUR MEDS BY ELIG CLIN: HCPCS | Performed by: NURSE PRACTITIONER

## 2022-03-04 PROCEDURE — 3052F HG A1C>EQUAL 8.0%<EQUAL 9.0%: CPT | Performed by: NURSE PRACTITIONER

## 2022-03-04 PROCEDURE — 1101F PT FALLS ASSESS-DOCD LE1/YR: CPT | Performed by: NURSE PRACTITIONER

## 2022-03-04 RX ORDER — GABAPENTIN 100 MG/1
100 CAPSULE ORAL
Qty: 90 CAPSULE | Refills: 1 | Status: SHIPPED | OUTPATIENT
Start: 2022-03-04 | End: 2022-08-30

## 2022-03-04 RX ORDER — INSULIN PUMP SYRINGE, 3 ML
EACH MISCELLANEOUS
Qty: 1 KIT | Refills: 0 | Status: SHIPPED | OUTPATIENT
Start: 2022-03-04

## 2022-03-04 RX ORDER — ACETAMINOPHEN 500 MG
1000 TABLET ORAL 2 TIMES DAILY
Qty: 360 TABLET | Refills: 1 | Status: SHIPPED | OUTPATIENT
Start: 2022-03-04 | End: 2022-08-30

## 2022-03-04 RX ORDER — LANCETS
EACH MISCELLANEOUS
Qty: 1 EACH | Refills: 11 | Status: SHIPPED | OUTPATIENT
Start: 2022-03-04

## 2022-03-04 RX ORDER — MELATONIN
2000 DAILY
Qty: 90 TABLET | Refills: 1 | Status: SHIPPED | OUTPATIENT
Start: 2022-03-04 | End: 2022-05-27

## 2022-03-04 NOTE — PROGRESS NOTES
Home Based Primary Care [facility]) & Supportive Services    [phone]      NOTE: Home Based Primary Care is a PROVIDER (MD/NP) based interdisciplinary practice (RN, LCSW, Pharmacy, Dietician) for patients who cannot access (or have a taxing effort) primary / speciality medical care in an office setting. [facility] [place] is NOT [facility] but works with [facility]. when there is a skilled need. Our MD/NPs are integral in Care Transitions; PLEASE CALL [phone] if this patient arrives in the Emergency Department or Hospital.        Date of Current Visit: 22  Patient/Family present for Current Visit: patient, daughter Susi Alexander, caregiver Carmen  Goals of Care as stated by the patient/family is: to remain at home, limit interventions     Preferences for hospitalization if that were to be necessary:  [] Patient DOES NOT WANT hospitalization; focus all treatments at home  [x] Patient WOULD WANT hospitalization for potentially reversible causes  Patient prefers hospitalization at: [facility]  Per daughter, Susi Alexander, she and her brother (who lives in [place] and is a PCP) share POA for their dad. She also reports that he has a DDNR; she will get us copies of these for our records prior to our next visit. Per Susi Alexander, he wishes to only go to the hospital \"if I'm bleeding or have a broken bone. \"       Geogrette Guaman (: 10/7/1931) is a 80 y.o. male, established patient, here for evaluation of the following chief complaint(s):  Diabetes, Hypertension, Dementia, and Wax in Ear       ASSESSMENT/PLAN:  Below is the assessment and plan developed based on review of pertinent history, physical exam, labs, studies, and medications. 1. Type 2 diabetes mellitus with hyperglycemia, without long-term current use of insulin (HCC)  -     gabapentin (NEURONTIN) 100 mg capsule; Take 1 Capsule by mouth nightly. Max Daily Amount: 100 mg.  Indications: diabetic complication causing injury to some body nerves, neuropathic pain, Normal, Disp-90 Capsule, R-1  -     Blood-Glucose Meter monitoring kit; Use to check blood sugar three times weekly. , Normal, Disp-1 Kit, R-0  -     lancets misc; Use to check blood sugar three times weekly. , Normal, Disp-1 Each, R-11  -     glucose blood VI test strips (ASCENSIA AUTODISC VI, ONE TOUCH ULTRA TEST VI) strip; Use to check blood sugar three times weekly and PRN. , Normal, Disp-30 Strip, R-11  2. Alzheimer's disease ([facility] [address]  3. Primary hypertension  4. Vitamin D deficiency  -     cholecalciferol (VITAMIN D3) (1000 Units /25 mcg) tablet; Take 2 Tablets by mouth daily. , Normal, Disp-90 Tablet, R-1  5. Chronic left-sided low back pain without sciatica  -     acetaminophen (TYLENOL) 500 mg tablet; Take 2 Tablets by mouth two (2) times a day. Indications: backache, Normal, Disp-360 Tablet, R-1  6. History of prostate cancer  7. History of thyroid cancer  8. Postablative hypothyroidism  9. Impacted cerumen of both ears  -     REMOVE IMPACTED EAR WAX         Type 2 diabetes - HgA1C 8.8% in February 2022. Medications were adjusted (metformin increased from 500mg BID to 1000mg BID) in January prior to leaving the assisted living facility. Will maintain current regimen for now, with discussion today of possibly switching to Janumet if next HgA1C is not < 8.0%. Gabapentin 100mg QHS controlling his neuropathic pain; continue. Will attempt to collect microalbumin at next visit; unable to avoid today. Information left in the home today for in-home optometry services. as well as leave information for in-home optometry services. Will send Rx for glucometer/test strips/lancets to be used to rule out high/low blood sugar levels, but no indication for regular checks at this point due to the distress it may cause due to his dementia.  Discussed possibly discontinuing atorvastatin, but as it is not currently causing difficulty and his independence would be significantly decreased if he were to have a stroke, will maintain for now. Asked caregiver to discuss with her brother. Alzheimer's Disease - Currently maintained on donepezil 10mg daily and namenda 10mg. No disturbing behaviors. Discussed allowing him to do as much for himself as possible; will continue to monitor and consider comprehensive cognitive assessment in the future. Hypertension - Blood pressure under excellent control today on diltiazem ER 120mg daily and losartan 25mg daily. No changes today. Hx of thyroid cancer/postablative hypothyroidism - TSH and FT4 at goal in February 2022; continue levothyroxine 112mcg daily. Recheck in 1 year. Hx of prostate CA - PSA checked February 2022 with PSA of 3.4. Discussed that this may be a benign finding as I have no other results to which to compare this number, but that urological referral/treatment may not align with goals of care. Aliza Nascimento to discuss with her brother, but referral deferred today. Vitamin deficiencies - Previously on vit B12 and vit D supplementation from [facility]. Labs checked in February 2022; will discontinue B12 supplementation as this does not appear to be necessary but continue vit D supplementation to prevent deficiency, especially given fall risk. Impacted cerumen, bilateral - Bilateral irrigation & curettage by this provider today yielded a large quantity of wax. Encouraged ongoing use of debrox 2x/week to prevent reaccumulation. Debility/history of recent fall - Currently receiving  PT services. Discussed the importance of maintaining as much function as possible. Stage II Pressure Ulcer - Skin now intact. Continue offloading pressure & calmoseptine. Improving. Chronic lumbar back pain - No known injury, has been present for years. No abnormalities on basic exam today. Minimal improvement with tylenol 500mg BID. Encouraged frequent movement/ambulation as well, as this appears to help.  Discussed various options today; will increase acetaminophen to 1000mg BID, use TENS unit available in the home, and I will ask the [facility] PT to evaluation & treat this low back pain as well. X-ray discussed but declined at this time.        I have asked the daughter to have a copy of his Covid-19 card for our review/upload and his AMD, POA, and DNR for our review at his next visit. If his DDNR is not present at that time, will complete at that time.              Health Maintenance Due   Topic Date Due    COVID-19 Vaccine (1) Never done    DTaP/Tdap/Td series (1 - Tdap) Never done    Shingrix Vaccine Age 50> (1 of 2) Never done    Pneumococcal 65+ years (1 of 1 - PPSV23) Never done    Flu Vaccine (1) Never done                  Return in 6 weeks (on 4/15/2022) for 6 week follow up. SUBJECTIVE/OBJECTIVE:  HPI    The patient is seen today in his home due to taxing effort to seek primary care services in the community. 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 He was previously living in an assisted living facility in [place] for approximately 4 years, after his wife . He was unable to care for himself so moved to the assisted living facility, and was moved to [place] to be closer to his daughter and her family. He has 4 hours of caregiving each day through his [employer] benefits, which his daughter Ginger Dean reports today is plenty. He ambulates around the home with a rolling walker, and uses a wheelchair if he goes out, which is usually about every 2 weeks to go to a restaurant. He has had 2 falls in the past year, but none with injury. He is reportedly fully vaccinated against Covid-19, including a booster, but his card is at the  [address] in [facility]; his daughter will get this for our records. A typical day includes him waking up around 8:30am, showering with help from his aide, and eating breakfast. He'll sit in the living room and watch television until lunch, then nap for about an hour.  He eats dinner around 6:30pm and goes to bed around 8/9pm, sleeping well through the night. He has a history of hypertension, currently taking diltiazem ER 120mg and losartan 25mg daily. His daughter denies history of MI or stroke. Home blood pressures are not routinely checked. He has a history of prostate cancer, diagnosed in approximately 2002, for which he did radiation therapy. He has a history of thyroid cancer, diagnosed in approximately 1998. He had thyroid ablation and currently takings levothyroxine 112mcg daily. He has type 2 diabetes with diabetic neuropathy. He takes metformin 1000mg BID, increased in January 2022 while at the assisted living facility, and takes gabapentin 100mg QHS for neuropathic pain, which is effective. Blood sugars are not routinely checked, and they do not have a functioning glucometer. He has not had an eye exam in approximately 4 years. He also has Alzheimer's disease, diagnosed in approximately 2014. He takes donepezil 10mg nightly along with namenda 10mg. His daughter reports that he does not have any behavior disturbances and sleeps well, describing his symptoms as \"pleasantly confused. \"    He is incontinent of urine and stool, but is able to change his brief with some assistance and prompting. He is able to feed himself when food is prepared for him and has a hearty appetite. He sleeps well. His daughter and son reportedly share POA (both medical and financial); she will get us these documents. He has started physical therapy at home for his falls/ambulation, and this is going well. He has not had much improvement in his back pain since the last visit with scheduled tylenol. As previously discussed, this is a long-standing problem that has not had any known cause. Pain is worse when sitting for long periods of time, improved with ambulation and massage. Pain is slightly to the left of the spine.    They have been using debrox drops nightly and are eager for me to remove ear wax from his ear today to improve his hearing.      REVIEW OF SYSTEMS:     ROS  Constitutional: denies activity change, appetite change, fatigue, fever  HEENT: denies congestion, sinus pressure, sore throat; endorses hearing loss  CV: denies chest pain, palpitations, edema  Respiratory: denies shortness of breath, wheezing  GI: denies abdominal pain, blood in stool, diarrhea, constipation  MSK: denies arthralgias, joint swelling; endorses chronic left-sided lumbar back pain  Neuro: denies frequent headaches, dizziness, numbness/tingling  Skin: endorses left-sided buttock \"sore\"  Psych: denies depression, anxiety, confusion, endorses chronic memory changes         FUNCTIONAL ASSESSMENT:     Palliative Performance Scale (PPS): 50     PSYCHOSOCIAL/SPIRITUAL SCREENING:      Any spiritual / Voodoo concerns: [] Yes /  [x] No     Caregiver Burnout: [] Yes /  [x] No /  [] No Caregiver Present       PHYSICAL EXAM:       Visit Vitals  BP (!) 106/58 (BP 1 Location: Left upper arm, BP Patient Position: Sitting, BP Cuff Size: Adult)   Pulse 73   Temp (!) 96.7 °F (35.9 °C) (Temporal)   Resp 20   Wt 175 lb 1.6 oz (79.4 kg)   SpO2 96%       Constitutional: no acute distress, pleasant, well-groomed  male; overweight; ambulates with rollator  HEENT: normocephalic, atraumatic, external ears normal bilaterally; moist mucous membranes; EOM intact, PERRL; large amount of impacted cerumen bilaterally, unable to visualize eardrum (improved after bilateral irrigation & curettage by this provider)  CV: regular rate and rhythm, no murmurs noted  Pulm: normal effort, normal breath sounds without wheezing or rhonchi  Abd: normal bowel sounds, soft, non-tender  : deferred  Skin: warm, dry; nickel-sized area of erythema on inner left buttock, skin now closed (improved from previous)  MSK: normal inspection and palpation of lumbar spine, identifying left lumbar paraspinal muscles as location of pain  Neuro: A & O x 2; mental status at baseline; remote memory intact  Psych: mood and behavior normal           Current Outpatient Medications on File Prior to Visit   Medication Sig Dispense Refill    donepeziL (ARICEPT) 10 mg tablet TAKE 1 TABLET BY MOUTH NIGHTLY 30 Tablet 0    carbamide peroxide (DEBROX) 6.5 % otic solution Administer 5 Drops into each ear two (2) times a day. 30 mL 0    menthol-zinc oxide (CALMOSEPTINE) 0.44-20.6 % oint Apply 1 Each to affected area. Apply thin Layer to Sacral area as needed and after incontinence care   Indications: skin irritation      cyanocobalamin (Vitamin B-12) 1,000 mcg tablet Take 1,000 mcg by mouth daily. Indications: prevention of vitamin B12 deficiency      tamsulosin (Flomax) 0.4 mg capsule Take 0.4 mg by mouth daily. Indications: enlarged prostate with urination problem      metFORMIN (GLUCOPHAGE) 1,000 mg tablet Take 1,000 mg by mouth two (2) times daily (with meals).  memantine (NAMENDA) 10 mg tablet Take 10 mg by mouth two (2) times a day. Indications: moderate to severe Alzheimer's type dementia      losartan (COZAAR) 25 mg tablet Take 25 mg by mouth daily. Indications: high blood pressure      levothyroxine (SYNTHROID) 112 mcg tablet Take 112 mcg by mouth Daily (before breakfast). Indications: additional treatment for thyroid cancer      dilTIAZem ER (DILACOR XR) 120 mg capsule Take 120 mg by mouth daily. Indications: high blood pressure      atorvastatin (LIPITOR) 10 mg tablet Take 10 mg by mouth nightly. Indications: excessive fat in the blood      aspirin 81 mg chewable tablet Take 81 mg by mouth daily. Indications: stroke prevention (Patient not taking: Reported on 3/4/2022)      [DISCONTINUED] cholecalciferol, vitamin D3, (Vitamin D3) 50 mcg (2,000 unit) tab Take 1 Tablet by mouth daily.  [DISCONTINUED] acetaminophen (TYLENOL) 325 mg tablet Take 650 mg by mouth every eight (8) hours as needed for Pain.  Indications: pain associated with arthritis, backache  [DISCONTINUED] gabapentin (NEURONTIN) 100 mg capsule Take 100 mg by mouth nightly. Indications: diabetic complication causing injury to some body nerves, neuropathic pain       No current facility-administered medications on file prior to visit. Advanced Care Planning    Primary Decision Maker (Active): Nalini Peralta - [place] - [phone]    No flowsheet data found. On this date 03/04/2022 I have spent 60 minutes reviewing previous notes, test results and face to face with the patient discussing the diagnosis and importance of compliance with the treatment plan as well as documenting on the day of the visit. An electronic signature was used to authenticate this note.   -- Charline Vazquez NP

## 2022-03-04 NOTE — PATIENT INSTRUCTIONS

## 2022-03-09 ENCOUNTER — HOME CARE VISIT (OUTPATIENT)
Dept: SCHEDULING | Facility: HOME HEALTH | Age: 87
End: 2022-03-09
Payer: MEDICARE

## 2022-03-09 PROCEDURE — G0157 HHC PT ASSISTANT EA 15: HCPCS

## 2022-03-10 VITALS
RESPIRATION RATE: 16 BRPM | DIASTOLIC BLOOD PRESSURE: 64 MMHG | OXYGEN SATURATION: 98 % | HEART RATE: 75 BPM | TEMPERATURE: 98.4 F | SYSTOLIC BLOOD PRESSURE: 116 MMHG

## 2022-03-11 ENCOUNTER — HOME CARE VISIT (OUTPATIENT)
Dept: SCHEDULING | Facility: HOME HEALTH | Age: 87
End: 2022-03-11
Payer: MEDICARE

## 2022-03-11 PROCEDURE — G0157 HHC PT ASSISTANT EA 15: HCPCS

## 2022-03-14 VITALS
RESPIRATION RATE: 18 BRPM | TEMPERATURE: 98.2 F | HEART RATE: 67 BPM | DIASTOLIC BLOOD PRESSURE: 72 MMHG | OXYGEN SATURATION: 97 % | SYSTOLIC BLOOD PRESSURE: 116 MMHG

## 2022-03-15 ENCOUNTER — HOME CARE VISIT (OUTPATIENT)
Dept: SCHEDULING | Facility: HOME HEALTH | Age: 87
End: 2022-03-15
Payer: MEDICARE

## 2022-03-15 ENCOUNTER — HOME CARE VISIT (OUTPATIENT)
Dept: HOME HEALTH SERVICES | Facility: HOME HEALTH | Age: 87
End: 2022-03-15
Payer: MEDICARE

## 2022-03-15 PROCEDURE — G0157 HHC PT ASSISTANT EA 15: HCPCS

## 2022-03-16 ENCOUNTER — TELEPHONE (OUTPATIENT)
Dept: FAMILY MEDICINE CLINIC | Age: 87
End: 2022-03-16

## 2022-03-16 NOTE — TELEPHONE ENCOUNTER
[facility]) & Supportive Services  Social Work Telephone Encounter  Main Office: [phone]    Patient name: Velma Wiggins    Date of telephone encounter: 3/16/22    Session today completed with: Kristyn Bales    Relationship to patient: Daughter    Purpose of call: 1 month follow-up     Conversation narrative: LCSW called pt's dtr, Kristyn Bales, for routine psychosocial assessment. She states that things have been going very well with her father. She said, \"it's going better than we thought\", citing that dad has been getting good care and more attention than he did while living in the [facility] in [place]. She sees an improvement in his ability to remember certain things, and his mood. She denies any current psychosocial concerns or questions, expressed appreciation for [facility] and LCSW call today. Assessment and Plan: Pt has good caregiving, access to essential resources. Low psychosocial needs at this time. Plan for follow up: LCSW to remain available for support and resources as needed, will follow up with pt/family in 2 months.        DEMARCO Marc, [facility]    [place] Based [address] [place] [address]  (l) [phone]

## 2022-03-17 ENCOUNTER — HOME CARE VISIT (OUTPATIENT)
Dept: SCHEDULING | Facility: HOME HEALTH | Age: 87
End: 2022-03-17
Payer: MEDICARE

## 2022-03-17 VITALS
RESPIRATION RATE: 18 BRPM | OXYGEN SATURATION: 97 % | TEMPERATURE: 98.1 F | SYSTOLIC BLOOD PRESSURE: 121 MMHG | HEART RATE: 67 BPM | DIASTOLIC BLOOD PRESSURE: 68 MMHG

## 2022-03-17 PROCEDURE — G0157 HHC PT ASSISTANT EA 15: HCPCS

## 2022-03-18 ENCOUNTER — HOME CARE VISIT (OUTPATIENT)
Dept: HOME HEALTH SERVICES | Facility: HOME HEALTH | Age: 87
End: 2022-03-18
Payer: MEDICARE

## 2022-03-18 VITALS
TEMPERATURE: 98.2 F | OXYGEN SATURATION: 97 % | DIASTOLIC BLOOD PRESSURE: 75 MMHG | HEART RATE: 67 BPM | RESPIRATION RATE: 18 BRPM | SYSTOLIC BLOOD PRESSURE: 126 MMHG

## 2022-03-23 ENCOUNTER — TELEPHONE (OUTPATIENT)
Dept: FAMILY MEDICINE CLINIC | Age: 87
End: 2022-03-23

## 2022-03-23 ENCOUNTER — HOME CARE VISIT (OUTPATIENT)
Dept: HOME HEALTH SERVICES | Facility: HOME HEALTH | Age: 87
End: 2022-03-23
Payer: MEDICARE

## 2022-03-23 NOTE — TELEPHONE ENCOUNTER
Call received from [facility] who is seeing patient to notify that patient's insurance has reached the number of visit limit for PT.  She has spoken with patient's daughter, who reportedly agrees that patient has probably reached his goals and is in agreement to discharge from PT.

## 2022-03-29 RX ORDER — LOSARTAN POTASSIUM 25 MG/1
TABLET ORAL
Qty: 30 TABLET | Refills: 1 | Status: SHIPPED | OUTPATIENT
Start: 2022-03-29 | End: 2022-05-27

## 2022-03-29 RX ORDER — METFORMIN HYDROCHLORIDE 1000 MG/1
TABLET ORAL
Qty: 60 TABLET | Refills: 1 | Status: SHIPPED | OUTPATIENT
Start: 2022-03-29 | End: 2022-05-27

## 2022-03-29 RX ORDER — TAMSULOSIN HYDROCHLORIDE 0.4 MG/1
CAPSULE ORAL
Qty: 30 CAPSULE | Refills: 1 | Status: SHIPPED | OUTPATIENT
Start: 2022-03-29 | End: 2022-05-27

## 2022-03-29 RX ORDER — MEMANTINE HYDROCHLORIDE 10 MG/1
TABLET ORAL
Qty: 60 TABLET | Refills: 1 | Status: SHIPPED | OUTPATIENT
Start: 2022-03-29 | End: 2022-05-27

## 2022-03-29 RX ORDER — LEVOTHYROXINE SODIUM 112 UG/1
TABLET ORAL
Qty: 30 TABLET | Refills: 1 | Status: SHIPPED | OUTPATIENT
Start: 2022-03-29 | End: 2022-05-27

## 2022-03-29 RX ORDER — ATORVASTATIN CALCIUM 10 MG/1
TABLET, FILM COATED ORAL
Qty: 30 TABLET | Refills: 1 | Status: SHIPPED | OUTPATIENT
Start: 2022-03-29 | End: 2022-05-27

## 2022-03-29 RX ORDER — DILTIAZEM HYDROCHLORIDE 120 MG/1
CAPSULE, COATED, EXTENDED RELEASE ORAL
Qty: 30 CAPSULE | Refills: 1 | Status: SHIPPED | OUTPATIENT
Start: 2022-03-29 | End: 2022-05-27

## 2022-04-05 ENCOUNTER — TELEPHONE (OUTPATIENT)
Dept: FAMILY MEDICINE CLINIC | Age: 87
End: 2022-04-05

## 2022-04-05 NOTE — TELEPHONE ENCOUNTER
Call received from patient's daughter, Pedro Branch, to report that patient has been lethargic, with decreasing appetite since Sunday, 4/3. She reports several family members having \"stomach bug\", patient experienced diarrhea on Sunday and 1 lose BM today. Patient is confused today and when his aide was trying to help him in the shower, she noticed patient leaning in the shower. Daughter reports patient is acting like he is unable to hear the conversations and he is barely talking. But denies slurred speech or unequal strength. No changes in urine was noted by daughter or caregiver. I discussed these finding with Antonietta Lizarraga NP, who recommended that [facility] should be called. I called patient's daughter back and she verbalized that [facility] providers don't really know the patient, and his symptoms are mild enough that she doesn't feel like calling them would be beneficial.     I informed Antonietta Lizarraga NP of the conversation with daughter, Joleen Arrieta recommended increase fluid intake \"push fluids\" such as Gatorade and rest, and if patient feels worse or presents with any other symptoms, family should call [facility] for assessment. Call returned to Pedro Branch who verbalized understanding and agreement with this plan.

## 2022-04-13 ENCOUNTER — TELEPHONE (OUTPATIENT)
Dept: FAMILY MEDICINE CLINIC | Age: 87
End: 2022-04-13

## 2022-04-13 NOTE — TELEPHONE ENCOUNTER
Davonte Ludwig called and said that she is going out of town on Friday, 4/15/22. She said she must leave for [place] by 11am and wants to know if it is still possible to keep the patient's appt.

## 2022-04-13 NOTE — TELEPHONE ENCOUNTER
Call returned to patient's daughter, Naoma Bence to inform that Justin Renee NP would be willing to start visit with patient on Friday at 9:15 AM.    Daughter verbalizes appreciation for this change. Naoma Bence also reports patient ended up having an UTI and is currently taking Macrobid. Justin Renee NP was verbally updated about antibiotic therapy.

## 2022-04-15 ENCOUNTER — HOME VISIT (OUTPATIENT)
Dept: FAMILY MEDICINE CLINIC | Age: 87
End: 2022-04-15
Payer: MEDICARE

## 2022-04-15 VITALS
TEMPERATURE: 96.8 F | DIASTOLIC BLOOD PRESSURE: 80 MMHG | HEART RATE: 77 BPM | SYSTOLIC BLOOD PRESSURE: 136 MMHG | OXYGEN SATURATION: 95 % | RESPIRATION RATE: 20 BRPM

## 2022-04-15 DIAGNOSIS — F02.80 ALZHEIMER'S DISEASE (HCC): Primary | ICD-10-CM

## 2022-04-15 DIAGNOSIS — H61.23 IMPACTED CERUMEN OF BOTH EARS: ICD-10-CM

## 2022-04-15 DIAGNOSIS — Z85.46 HISTORY OF PROSTATE CANCER: ICD-10-CM

## 2022-04-15 DIAGNOSIS — M54.50 CHRONIC LEFT-SIDED LOW BACK PAIN WITHOUT SCIATICA: ICD-10-CM

## 2022-04-15 DIAGNOSIS — Z66 DO NOT RESUSCITATE: ICD-10-CM

## 2022-04-15 DIAGNOSIS — I10 PRIMARY HYPERTENSION: ICD-10-CM

## 2022-04-15 DIAGNOSIS — G30.9 ALZHEIMER'S DISEASE (HCC): Primary | ICD-10-CM

## 2022-04-15 DIAGNOSIS — E89.0 POSTABLATIVE HYPOTHYROIDISM: ICD-10-CM

## 2022-04-15 DIAGNOSIS — G89.29 CHRONIC LEFT-SIDED LOW BACK PAIN WITHOUT SCIATICA: ICD-10-CM

## 2022-04-15 DIAGNOSIS — E11.65 TYPE 2 DIABETES MELLITUS WITH HYPERGLYCEMIA, WITHOUT LONG-TERM CURRENT USE OF INSULIN (HCC): ICD-10-CM

## 2022-04-15 PROCEDURE — G8427 DOCREV CUR MEDS BY ELIG CLIN: HCPCS | Performed by: NURSE PRACTITIONER

## 2022-04-15 PROCEDURE — 1101F PT FALLS ASSESS-DOCD LE1/YR: CPT | Performed by: NURSE PRACTITIONER

## 2022-04-15 PROCEDURE — G8536 NO DOC ELDER MAL SCRN: HCPCS | Performed by: NURSE PRACTITIONER

## 2022-04-15 PROCEDURE — 3052F HG A1C>EQUAL 8.0%<EQUAL 9.0%: CPT | Performed by: NURSE PRACTITIONER

## 2022-04-15 PROCEDURE — 99350 HOME/RES VST EST HIGH MDM 60: CPT | Performed by: NURSE PRACTITIONER

## 2022-04-15 NOTE — PROGRESS NOTES
Home Based Primary Care [facility]) & Supportive Services    [phone]      NOTE: Home Based Primary Care is a PROVIDER (MD/NP) based interdisciplinary practice (RN, LCSW, Pharmacy, Dietician) for patients who cannot access (or have a taxing effort) primary / speciality medical care in an office setting. [facility] is NOT [facility] but works with [facility]. when there is a skilled need. Our MD/NPs are integral in Care Transitions; PLEASE CALL [phone] if this patient arrives in the Emergency Department or Hospital.        Date of Current Visit: 04/15/22  Patient/Family present for Current Visit: patient, daughter Mauro Senior of Care as stated by the patient/family is: to remain at home, limit interventions     Preferences for hospitalization if that were to be necessary:  [] Patient DOES NOT WANT hospitalization; focus all treatments at home  [x] Patient WOULD WANT hospitalization for potentially reversible causes  Patient prefers hospitalization at: [facility]    Per daughter, Pedro Branch, she and her brother (who lives in [place] and is a PCP) share POA for their dad. Unable to find DNR. Per Vasquez Jones, he wishes to only go to the hospital \"if I'm bleeding or have a broken bone. \"       Gisela Cordero (: 10/7/1931) is a 80 y.o. male, established patient, here for evaluation of the following chief complaint(s):  Follow Up Chronic Condition       ASSESSMENT/PLAN:  Below is the assessment and plan developed based on review of pertinent history, physical exam, labs, studies, and medications. 1. Alzheimer's disease ([facility] [address]  2. Type 2 diabetes mellitus with hyperglycemia, without long-term current use of insulin (HCC)  3. Primary hypertension  4. Postablative hypothyroidism  5. Chronic left-sided low back pain without sciatica         Type 2 diabetes - HgA1C 8.8% in 2022.  Medications were adjusted (metformin increased from 500mg BID to 1000mg BID) in January prior to leaving the assisted living facility. Will maintain current regimen for now, with discussion today of possibly switching to Janumet if next HgA1C is not < 8.0%. Gabapentin 100mg QHS controlling his neuropathic pain; continue. Will attempt to collect microalbumin at next visit; unable to avoid today. Information left in the home today for in-home optometry services. Not currently checking blood sugar regularly, but now they have the equipment they need should he need his glucose checked. iDiscussed possibly discontinuing atorvastatin, but as it is not currently causing difficulty and his independence would be significantly decreased if he were to have a stroke, will maintain for now. Asked caregiver to discuss with her brother. Alzheimer's Disease - Currently maintained on donepezil 10mg daily and namenda 10mg. No disturbing behaviors. Discussed allowing him to do as much for himself as possible; will continue to monitor and consider comprehensive cognitive assessment in the future. Hypertension - Blood pressure under excellent control today on diltiazem ER 120mg daily and losartan 25mg daily. No changes today. Hx of thyroid cancer/postablative hypothyroidism - TSH and FT4 at goal in February 2022; continue levothyroxine 112mcg daily. Recheck in 1 year. Hx of prostate CA - PSA checked February 2022 with PSA of 3.4. Discussed that this may be a benign finding as I have no other results to which to compare this number, but that urological referral/treatment may not align with goals of care. Radha Client to discuss with her brother, but referral deferred today. Vitamin deficiencies - Previously on vit B12 and vit D supplementation from [facility]. Labs checked in February 2022; will discontinue B12 supplementation as this does not appear to be necessary but continue vit D supplementation to prevent deficiency, especially given fall risk.    Impacted cerumen, bilateral - Bilateral curettage by this provider today yielded a moderate quantity of wax. Encouraged ongoing use of debrox 2 - 3x/week to prevent reaccumulation. Debility/history of recent fall - Recently completed [facility] PT services. Discussed the importance of maintaining as much function as possible. Stage II Pressure Ulcer - Skin now intact. Continue offloading pressure & calmoseptine. Improving. Chronic lumbar back pain - No known injury, has been present for years. Did receive exercises from PT recently for his back, which help when he completes them; also having relief with TENS unit, when it is used. Encouraged frequent movement/ambulation as well, as this appears to help. Covid-19 vaccine dates retrieved from [employer] and uploaded to chart. Due for booster; phone number left in the home today for daughter to schedule home Covid-19 booster. DDNR completed today. Will check with team LCSW for POA paperwork that [facility] reportedly emailed to her. Will complete POST form at next visit.                Health Maintenance Due   Topic Date Due    COVID-19 Vaccine (1) Never done    DTaP/Tdap/Td series (1 - Tdap) Never done    Shingrix Vaccine Age 50> (1 of 2) Never done    Pneumococcal 65+ years (1 of 1 - PPSV23) Never done    Flu Vaccine (1) Never done              Return in 7 weeks (on 2022) for 2 month follow up with labs. SUBJECTIVE/OBJECTIVE:  HPI    The patient is seen today in his home due to taxing effort to seek primary care services in the community. He was treated about a week ago by Gatito Evangelista for worsening confusion, gait instability, fatigue; he was found to have a UTI and he was started on macrobid. Symptoms have been steadily improving, and he has approximately 4 doses of his antibiotic remaining. Mr. Karolina Lechuga is a retired [employer] , who moved to [place] in 2022 to live with his daughter, [facility]. He was previously living in an assisted living facility in [place] for approximately 4 years, after his wife .  He was unable to care for himself so moved to the assisted living facility, and was moved to [place] to be closer to his daughter and her family. He has 4 hours of caregiving each day through his VA benefits, which his daughter Saige Chapa reports today is plenty. He ambulates around the home with a rolling walker, and uses a wheelchair if he goes out, which is usually about every 2 weeks to go to a restaurant. He has had 2 falls in the past year, but none with injury. He is reportedly fully vaccinated against Covid-19, including a booster, but his card is at the [facility]; his daughter has not had success getting this from the [facility]. A typical day includes him waking up around 8:30am, showering with help from his aide, and eating breakfast. He'll sit in the living room and watch television until lunch, then nap for about an hour. He eats dinner around 6:30pm and goes to bed around 8/9pm, sleeping well through the night. He has a history of hypertension, currently taking diltiazem ER 120mg and losartan 25mg daily. His daughter denies history of MI or stroke. Home blood pressures are not routinely checked. He has a history of prostate cancer, diagnosed in approximately 2002, for which he did radiation therapy. PSA was elevated at 3.4 in February 2022; at this point, evaluation is being deferred. He has a history of thyroid cancer, diagnosed in approximately 1998. He had thyroid ablation and currently takings levothyroxine 112mcg daily. He has type 2 diabetes with diabetic neuropathy. He takes metformin 1000mg BID, increased in January 2022 while at the assisted living facility, and takes gabapentin 100mg QHS for neuropathic pain, which is effective. Blood sugars are not routinely checked, though they do now have a glucometer and testing supplies. He has not had an eye exam in approximately 4 years; information was left in the home to schedule in-home optometry exam at previous visit.    He also has Alzheimer's disease, diagnosed in approximately 2014. He takes donepezil 10mg nightly along with namenda 10mg. His daughter reports that he does not have any behavior disturbances and sleeps well, describing his symptoms as \"pleasantly confused. \"    He is incontinent of urine and stool, but is able to change his brief with some assistance and prompting. He is able to feed himself when food is prepared for him and has a hearty appetite. He sleeps well. His daughter and son reportedly share POA (both medical and financial); she reports that she sent this paperwork to our team LCSW recently. He has now completed PT for his gait instability and low back pain. He has some relief from the pain when doing the exercises and using the TENS unit, though it can be somewhat inconvenient to hook him up so he doesn't do it often. He has not had any falls since last visit and continues to walk with the rollator.      REVIEW OF SYSTEMS:     ROS  Constitutional: denies activity change, appetite change, fatigue, fever  HEENT: denies congestion, sinus pressure, sore throat; endorses hearing loss  CV: denies chest pain, palpitations, edema  Respiratory: denies shortness of breath, wheezing  GI: denies abdominal pain, blood in stool, diarrhea, constipation  MSK: denies arthralgias, joint swelling; endorses chronic left-sided lumbar back pain  Neuro: denies frequent headaches, dizziness, numbness/tingling  Skin: denies skin breakdown  Psych: denies depression, anxiety, confusion, endorses chronic memory changes         FUNCTIONAL ASSESSMENT:     Palliative Performance Scale (PPS): 50     PSYCHOSOCIAL/SPIRITUAL SCREENING:      Any spiritual / Lutheran concerns: [] Yes /  [x] No     Caregiver Burnout: [] Yes /  [x] No /  [] No Caregiver Present       PHYSICAL EXAM:       Visit Vitals  BP (!) 106/58 (BP 1 Location: Left upper arm, BP Patient Position: Sitting, BP Cuff Size: Adult)   Pulse 73   Temp (!) 96.7 °F (35.9 °C) (Temporal)   Resp 20   Wt 175 lb 1.6 oz (79.4 kg)   SpO2 96%       Constitutional: no acute distress, pleasant, well-groomed  male; overweight; ambulates with rollator  HEENT: normocephalic, atraumatic, external ears normal bilaterally; moist mucous membranes; conjugate gaze; moderate amount of impacted cerumen bilaterally, though able to visualize eardrum (improved after bilateral curettage by this provider)  CV: regular rate and rhythm, no murmurs noted  Pulm: normal effort, normal breath sounds without wheezing or rhonchi  Abd: normal bowel sounds, soft, non-tender  : deferred  Skin: warm, dry; orevious stage II PU now healed  Neuro: A & O x 2; mental status at baseline; remote memory intact  Psych: mood and behavior normal           Current Outpatient Medications on File Prior to Visit   Medication Sig Dispense Refill    atorvastatin (LIPITOR) 10 mg tablet TAKE 1 TABLET BY MOUTH EVERYDAY AT BEDTIME 30 Tablet 1    dilTIAZem ER (CARDIZEM CD) 120 mg capsule TAKE 1 CAPSULE BY MOUTH EVERY DAY 30 Capsule 1    levothyroxine (SYNTHROID) 112 mcg tablet TAKE 1 TABLET BY MOUTH EVERY DAY 30 Tablet 1    losartan (COZAAR) 25 mg tablet TAKE 1 TABLET BY MOUTH EVERY DAY 30 Tablet 1    memantine (NAMENDA) 10 mg tablet TAKE 1 TABLET BY MOUTH TWICE A DAY 60 Tablet 1    metFORMIN (GLUCOPHAGE) 1,000 mg tablet TAKE 1 TABLET BY MOUTH TWICE A DAY 60 Tablet 1    tamsulosin (FLOMAX) 0.4 mg capsule TAKE 1 CAPSULE BY MOUTH EVERYDAY AT BEDTIME 30 Capsule 1    donepeziL (ARICEPT) 10 mg tablet TAKE 1 TABLET BY MOUTH NIGHTLY 30 Tablet 5    cholecalciferol (VITAMIN D3) (1000 Units /25 mcg) tablet Take 2 Tablets by mouth daily. 90 Tablet 1    acetaminophen (TYLENOL) 500 mg tablet Take 2 Tablets by mouth two (2) times a day. Indications: backache 360 Tablet 1    gabapentin (NEURONTIN) 100 mg capsule Take 1 Capsule by mouth nightly. Max Daily Amount: 100 mg.  Indications: diabetic complication causing injury to some body nerves, neuropathic pain 90 Capsule 1    Blood-Glucose Meter monitoring kit Use to check blood sugar three times weekly. 1 Kit 0    lancets misc Use to check blood sugar three times weekly. 1 Each 11    glucose blood VI test strips (ASCENSIA AUTODISC VI, ONE TOUCH ULTRA TEST VI) strip Use to check blood sugar three times weekly and PRN. 30 Strip 11    carbamide peroxide (DEBROX) 6.5 % otic solution Administer 5 Drops into each ear two (2) times a day. 30 mL 0    menthol-zinc oxide (CALMOSEPTINE) 0.44-20.6 % oint Apply 1 Each to affected area. Apply thin Layer to Sacral area as needed and after incontinence care   Indications: skin irritation      aspirin 81 mg chewable tablet Take 81 mg by mouth daily. Indications: stroke prevention      [DISCONTINUED] dilTIAZem ER (DILACOR XR) 120 mg capsule Take 120 mg by mouth daily. Indications: high blood pressure (Patient not taking: Reported on 4/15/2022)       No current facility-administered medications on file prior to visit. Advanced Care Planning    Primary Decision Maker (Active): Han Vasquez - [facility] - [phone]    No flowsheet data found. On this date 04/15/2022 I have spent 60 minutes reviewing previous notes, test results and face to face with the patient discussing the diagnosis and importance of compliance with the treatment plan as well as documenting on the day of the visit. An electronic signature was used to authenticate this note.   -- Rocio Galaviz NP

## 2022-04-15 NOTE — PATIENT INSTRUCTIONS
Learning About Relief for Back Pain  What is back strain? Back strain is an injury that happens when you overstretch, or pull, a muscle in your back. You may hurt your back in an accident or when you exercise or lift something. Most back pain gets better with rest and time. You can take care of yourself at home to help your back heal.  What can you do first to relieve back pain? When you first feel back pain, try these steps:  · Walk. Take a short walk (10 to 20 minutes) on a level surface (no slopes, hills, or stairs) every 2 to 3 hours. Walk only distances you can manage without pain, especially leg pain. · Relax. Find a comfortable position for rest. Some people are comfortable on the floor or a medium-firm bed with a small pillow under their head and another under their knees. Some people prefer to lie on their side with a pillow between their knees. Don't stay in one position for too long. · Try heat or ice. Try using a heating pad on a low or medium setting, or take a warm shower, for 15 to 20 minutes every 2 to 3 hours. Or you can buy single-use heat wraps that last up to 8 hours. You can also try an ice pack for 10 to 15 minutes every 2 to 3 hours. You can use an ice pack or a bag of frozen vegetables wrapped in a thin towel. There is not strong evidence that either heat or ice will help, but you can try them to see if they help. You may also want to try switching between heat and cold. · Take pain medicine exactly as directed. ? If the doctor gave you a prescription medicine for pain, take it as prescribed. ? If you are not taking a prescription pain medicine, ask your doctor if you can take an over-the-counter medicine. What else can you do? · Stretch and exercise. Exercises that increase flexibility may relieve your pain and make it easier for your muscles to keep your spine in a good, neutral position. And don't forget to keep walking. · Do self-massage.  You can use self-massage to unwind after work or school or to energize yourself in the morning. You can easily massage your feet, hands, or neck. Self-massage works best if you are in comfortable clothes and are sitting or lying in a comfortable position. Use oil or lotion to massage bare skin. · Reduce stress. Back pain can lead to a vicious Morongo: Distress about the pain tenses the muscles in your back, which in turn causes more pain. Learn how to relax your mind and your muscles to lower your stress. Where can you learn more? Go to http://www.gray.com/  Enter Q517 in the search box to learn more about \"Learning About Relief for Back Pain. \"  Current as of: July 1, 2021               Content Version: 13.2  © 2006-2022 Healthwise, Incorporated. Care instructions adapted under license by [employer] (which disclaims liability or warranty for this information). If you have questions about a medical condition or this instruction, always ask your healthcare professional. [address] any warranty or liability for your use of this information.

## 2022-05-04 ENCOUNTER — TRANSCRIBE ORDER (OUTPATIENT)
Dept: SCHEDULING | Age: 87
End: 2022-05-04

## 2022-05-04 ENCOUNTER — HOSPITAL ENCOUNTER (EMERGENCY)
Age: 87
Discharge: HOME | End: 2022-05-04
Attending: EMERGENCY MEDICINE
Payer: MEDICARE

## 2022-05-04 ENCOUNTER — HOSPITAL ENCOUNTER (OUTPATIENT)
Dept: VASCULAR SURGERY | Age: 87
Discharge: HOME | End: 2022-05-04
Payer: MEDICARE

## 2022-05-04 ENCOUNTER — TELEPHONE (OUTPATIENT)
Dept: FAMILY MEDICINE CLINIC | Age: 87
End: 2022-05-04

## 2022-05-04 VITALS
RESPIRATION RATE: 18 BRPM | TEMPERATURE: 98.6 F | SYSTOLIC BLOOD PRESSURE: 135 MMHG | DIASTOLIC BLOOD PRESSURE: 93 MMHG | OXYGEN SATURATION: 98 % | HEART RATE: 72 BPM

## 2022-05-04 DIAGNOSIS — I82.412 DVT FEMORAL (DEEP VENOUS THROMBOSIS) WITH THROMBOPHLEBITIS, LEFT (HCC): Primary | ICD-10-CM

## 2022-05-04 DIAGNOSIS — M79.606 PAIN, LOWER EXTREMITY: Primary | ICD-10-CM

## 2022-05-04 DIAGNOSIS — M79.669 CALF PAIN: ICD-10-CM

## 2022-05-04 DIAGNOSIS — M79.606 PAIN, LOWER EXTREMITY: ICD-10-CM

## 2022-05-04 LAB
ALBUMIN SERPL-MCNC: 3.3 G/DL (ref 3.5–5)
ALBUMIN/GLOB SERPL: 0.8 {RATIO} (ref 1.1–2.2)
ALP SERPL-CCNC: 49 U/L (ref 45–117)
ALT SERPL-CCNC: 31 U/L (ref 12–78)
ANION GAP SERPL CALC-SCNC: 8 MMOL/L (ref 5–15)
AST SERPL-CCNC: 16 U/L (ref 15–37)
BASOPHILS # BLD: 0 K/UL (ref 0–0.1)
BASOPHILS NFR BLD: 0 % (ref 0–1)
BILIRUB SERPL-MCNC: 0.5 MG/DL (ref 0.2–1)
BUN SERPL-MCNC: 18 MG/DL (ref 6–20)
BUN/CREAT SERPL: 17 (ref 12–20)
CALCIUM SERPL-MCNC: 9 MG/DL (ref 8.5–10.1)
CHLORIDE SERPL-SCNC: 104 MMOL/L (ref 97–108)
CO2 SERPL-SCNC: 25 MMOL/L (ref 21–32)
COMMENT, HOLDF: NORMAL
CREAT SERPL-MCNC: 1.06 MG/DL (ref 0.7–1.3)
DIFFERENTIAL METHOD BLD: ABNORMAL
EOSINOPHIL # BLD: 0.1 K/UL (ref 0–0.4)
EOSINOPHIL NFR BLD: 2 % (ref 0–7)
ERYTHROCYTE [DISTWIDTH] IN BLOOD BY AUTOMATED COUNT: 13.8 % (ref 11.5–14.5)
GLOBULIN SER CALC-MCNC: 4.1 G/DL (ref 2–4)
GLUCOSE SERPL-MCNC: 116 MG/DL (ref 65–100)
HCT VFR BLD AUTO: 37.3 % (ref 36.6–50.3)
HGB BLD-MCNC: 12.1 G/DL (ref 12.1–17)
IMM GRANULOCYTES # BLD AUTO: 0.1 K/UL (ref 0–0.04)
IMM GRANULOCYTES NFR BLD AUTO: 1 % (ref 0–0.5)
LACTATE SERPL-SCNC: 2.7 MMOL/L (ref 0.4–2)
LYMPHOCYTES # BLD: 1.5 K/UL (ref 0.8–3.5)
LYMPHOCYTES NFR BLD: 26 % (ref 12–49)
MCH RBC QN AUTO: 32.2 PG (ref 26–34)
MCHC RBC AUTO-ENTMCNC: 32.4 G/DL (ref 30–36.5)
MCV RBC AUTO: 99.2 FL (ref 80–99)
MONOCYTES # BLD: 0.7 K/UL (ref 0–1)
MONOCYTES NFR BLD: 12 % (ref 5–13)
NEUTS SEG # BLD: 3.4 K/UL (ref 1.8–8)
NEUTS SEG NFR BLD: 59 % (ref 32–75)
NRBC # BLD: 0 K/UL (ref 0–0.01)
NRBC BLD-RTO: 0 PER 100 WBC
PLATELET # BLD AUTO: 246 K/UL (ref 150–400)
PMV BLD AUTO: 9.3 FL (ref 8.9–12.9)
POTASSIUM SERPL-SCNC: 4.2 MMOL/L (ref 3.5–5.1)
PROT SERPL-MCNC: 7.4 G/DL (ref 6.4–8.2)
RBC # BLD AUTO: 3.76 M/UL (ref 4.1–5.7)
SAMPLES BEING HELD,HOLD: NORMAL
SODIUM SERPL-SCNC: 137 MMOL/L (ref 136–145)
WBC # BLD AUTO: 5.9 K/UL (ref 4.1–11.1)

## 2022-05-04 PROCEDURE — 36415 COLL VENOUS BLD VENIPUNCTURE: CPT

## 2022-05-04 PROCEDURE — 93005 ELECTROCARDIOGRAM TRACING: CPT

## 2022-05-04 PROCEDURE — 80053 COMPREHEN METABOLIC PANEL: CPT

## 2022-05-04 PROCEDURE — 74011250637 HC RX REV CODE- 250/637: Performed by: EMERGENCY MEDICINE

## 2022-05-04 PROCEDURE — 87040 BLOOD CULTURE FOR BACTERIA: CPT

## 2022-05-04 PROCEDURE — 85025 COMPLETE CBC W/AUTO DIFF WBC: CPT

## 2022-05-04 PROCEDURE — 99284 EMERGENCY DEPT VISIT MOD MDM: CPT

## 2022-05-04 PROCEDURE — 93970 EXTREMITY STUDY: CPT

## 2022-05-04 PROCEDURE — 83605 ASSAY OF LACTIC ACID: CPT

## 2022-05-04 RX ORDER — APIXABAN 5 MG (74)
KIT ORAL
Qty: 1 DOSE PACK | Refills: 0 | Status: SHIPPED | OUTPATIENT
Start: 2022-05-04 | End: 2022-05-09 | Stop reason: CLARIF

## 2022-05-04 RX ORDER — APIXABAN 5 MG (74)
KIT ORAL
Qty: 1 DOSE PACK | Refills: 0 | Status: SHIPPED | OUTPATIENT
Start: 2022-05-04 | End: 2022-05-04 | Stop reason: SDUPTHER

## 2022-05-04 RX ADMIN — APIXABAN 10 MG: 5 TABLET, FILM COATED ORAL at 18:37

## 2022-05-04 NOTE — ED NOTES
His daughter wanted his IV removed. She said he was restless and was going to pull it out. She wanted him to come back to a room due to his having dementia and that he was going to go off. I explained that a room should be available soon. She wanted a time.

## 2022-05-04 NOTE — ED PROVIDER NOTES
49-year-old male presents from home after being called about her positive DVT study. He has had left leg swelling and pain over the past few days. He had a outpatient duplex scan ordered today which was positive for pretty extensive DVT. Patient denies any chest pain or shortness of breath. No fever, cough, nausea, vomiting. No history of DVT. Is not take any blood thinning medications. Patient does have some dementia but lives at home with his daughter who is also the power of . Past Medical History:   Diagnosis Date    Alzheimer's dementia without behavioral disturbance ([facility] [address]     [facility] ([facility]     prostate - treated with radiation    Diabetes ([facility]     Hypertension     Thyroid cancer ([facility]     UTI (urinary tract infection)        Past Surgical History:   Procedure Laterality Date    HX ROTATOR CUFF REPAIR           Family History:   Problem Relation Age of Onset    Parkinson's Disease Mother     No Known Problems Father     Pancreatic Cancer Sister        Social History     Socioeconomic History    Marital status:      Spouse name: Not on file    Number of children: Not on file    Years of education: Not on file    Highest education level: Not on file   Occupational History    Not on file   Tobacco Use    Smoking status: Former Smoker     Packs/day: 1.00     Years: 40.00     Pack years: 40.00     Types: Cigarettes     Start date:      Quit date:      Years since quittin.3    Smokeless tobacco: Never Used   Substance and Sexual Activity    Alcohol use:  Yes     Alcohol/week: 7.0 standard drinks     Types: 7 Glasses of wine per week    Drug use: Not on file    Sexual activity: Not Currently   Other Topics Concern    Not on file   Social History Narrative    Not on file     Social Determinants of Health     Financial Resource Strain:     Difficulty of Paying Living Expenses: Not on file   Food Insecurity:     Worried About Running Out of Food in the Last Year: Not on file    Ran Out of Food in the Last Year: Not on file   Transportation Needs:     Lack of Transportation (Medical): Not on file    Lack of Transportation (Non-Medical): Not on file   Physical Activity:     Days of Exercise per Week: Not on file    Minutes of Exercise per Session: Not on file   Stress:     Feeling of Stress : Not on file   Social Connections:     Frequency of Communication with Friends and Family: Not on file    Frequency of Social Gatherings with Friends and Family: Not on file    Attends Scientologist Services: Not on file    Active Member of [address] [place] [address] [employer] or Organizations: Not on file    Attends Club or Organization Meetings: Not on file    Marital Status: Not on file   Intimate Partner Violence:     Fear of Current or Ex-Partner: Not on file    Emotionally Abused: Not on file    Physically Abused: Not on file    Sexually Abused: Not on file   Housing Stability:     Unable to Pay for Housing in the Last Year: Not on file    Number of [place] in the Last Year: Not on file    Unstable Housing in the Last Year: Not on file         ALLERGIES: Patient has no known allergies. Review of Systems   Constitutional: Negative for diaphoresis and fever. HENT: Negative for facial swelling. Eyes: Negative for visual disturbance. Respiratory: Negative for cough. Cardiovascular: Negative for chest pain. Gastrointestinal: Negative for abdominal pain. Genitourinary: Negative for dysuria. Musculoskeletal: Negative for joint swelling. Skin: Negative for rash. Neurological: Negative for headaches. Hematological: Negative for adenopathy. Psychiatric/Behavioral: Negative for suicidal ideas. Vitals:    05/04/22 1554 05/04/22 1806   BP: (!) 143/77 (!) 135/93   Pulse: 66 72   Resp: 16 18   Temp: 97.7 °F (36.5 °C) 98.6 °F (37 °C)   SpO2: 97% 98%            Physical Exam  Vitals and nursing note reviewed.    Constitutional:       General: He is not in acute distress. Appearance: He is well-developed. HENT:      Head: Normocephalic and atraumatic. Eyes:      Pupils: Pupils are equal, round, and reactive to light. Cardiovascular:      Rate and Rhythm: Normal rate. Pulmonary:      Effort: Pulmonary effort is normal. No respiratory distress. Abdominal:      General: There is no distension. Musculoskeletal:         General: Normal range of motion. Cervical back: Normal range of motion and neck supple. Skin:     General: Skin is warm and dry. Neurological:      Mental Status: He is alert and oriented to person, place, and time. MDM  Number of Diagnoses or Management Options  DVT femoral (deep venous thrombosis) with thrombophlebitis, left (HCC)  Diagnosis management comments: Assessment: Patient with pretty extensive DVT on the left leg. Vital signs are stable. No chest symptoms to suggest PE. I discussed possible admission and anticoagulation given the extent of his blood clot. Daughter who is at the bedside states that patient is a very strict do not hospitalize. I think it is reasonable to discharge her home and initiate anticoagulation. Daughter states that he is very steady and does not think he be a significant fall risk. He also has no history of bleeding disorders, GI hemorrhage, recent surgery. I confirmed the dosing of with pharmacy given his advanced age. We will initiate Eliquis in the emergency department have but was primary care doctor. I also provided follow-up with vascular surgery in case there is a procedure that they could offer however I think this may be limited given his advanced age and dementia.        Amount and/or Complexity of Data Reviewed  Clinical lab tests: reviewed  Tests in the medicine section of CPT®: reviewed           Procedures

## 2022-05-04 NOTE — TELEPHONE ENCOUNTER
Call received from Prasanth Villafuerte from [facility] reporting that patient had a duplex completed and the preliminary result shows that patient has DVT's on his L common Femoral Artery. Patient had been seen earlier today by [address] was ordered. Prasanth Villafuerte verbalized in these cases, they usually would send patient's to the ER. I informed that he should provide that recommendation po patient and daughter, Davonte Ludwig.

## 2022-05-04 NOTE — ED TRIAGE NOTES
He is here with his daughter after he had a negative doppler study of his left leg. He has had several days of a \"bug bite\" to his lower left leg that is now reddened with swelling to the lower leg. He has dementia.

## 2022-05-05 LAB
ATRIAL RATE: 65 BPM
CALCULATED P AXIS, ECG09: 84 DEGREES
CALCULATED R AXIS, ECG10: -39 DEGREES
CALCULATED T AXIS, ECG11: 63 DEGREES
DIAGNOSIS, 93000: NORMAL
P-R INTERVAL, ECG05: 188 MS
Q-T INTERVAL, ECG07: 408 MS
QRS DURATION, ECG06: 100 MS
QTC CALCULATION (BEZET), ECG08: 424 MS
VENTRICULAR RATE, ECG03: 65 BPM

## 2022-05-05 NOTE — TELEPHONE ENCOUNTER
Telephone call to [facility] who reports patient is doing well this AM.  He was seen in the ER and started on Eliquis, received his first dose there. [facility] will be getting [facility] today and they will start dosing. Instructed in anticoagulant precautions, s/s of GI bleed, advised any fall with head blow should result in ER visit for head CT to make sure there is no brain bleed. We discussed sudden onset of difficulty breathing or change in LOC might indicate mobilization of blood clot and would need 911 call to ER. She reports that he has swelling and tenderness of left foot and a single red streak. She accepted provider visit with Renata Estevez NP for Monday 5/9 between 10 -12. Saloni advised to schedule visit.

## 2022-05-06 ENCOUNTER — DOCUMENTATION ONLY (OUTPATIENT)
Dept: FAMILY MEDICINE CLINIC | Age: 87
End: 2022-05-06

## 2022-05-06 NOTE — PROGRESS NOTES
Home Based Primary Care  Plan of Care    [facility] Team Members: Shari Hopkins MD; Antonietta Lizarraga NP; Lisa Montelongo NP; Shakila Cerda, RN; Celestine Levin, RN; Gretel Bui, HUONG; SUDHA Sheffield  10/7/1931 / [id number]  male    The physician has reviewed and discussed the plan of care with the interdisciplinary group on 05/10/22 and agrees. Date of Initial Visit (Start of Care): 2/18/2022    Diagnosis:   Patient Active Problem List   Diagnosis Code    Type 2 diabetes mellitus with hyperglycemia, without long-term current use of insulin (HCC) E11.65    Alzheimer's disease (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 male who was referred to the [facility] program due to taxing effort to seek primary care services in the community. Patient discussed today for initial POC review. Advance Care Planning:  Code status: Prior      Primary Decision Maker (Active): Wilman Escobar - Daughter - [phone]   No flowsheet data found.     DME/Supplies:  Rolling walker     No Known Allergies    Nutritional Requirements:   Oral with supported meal preparation    Functional/Activity Level:  Ambulatory with assistance of cane, walker, or support of another person (significant impairment)    Safety Measures:   Fall risk and Self-care deficity    Acuity Level Rating: Low    Current Outpatient Medications   Medication Sig    apixaban (Eliquis DVT-PE Treat 30D Start) 5 mg (74 tabs) starter pack Take 10 mg (two 5 mg tablets) by mouth twice a day for 7 days Followed by 5 mg (one 5 mg tablet) by mouth twice a day    atorvastatin (LIPITOR) 10 mg tablet TAKE 1 TABLET BY MOUTH EVERYDAY AT BEDTIME    dilTIAZem ER (CARDIZEM CD) 120 mg capsule TAKE 1 CAPSULE BY MOUTH EVERY DAY    levothyroxine (SYNTHROID) 112 mcg tablet TAKE 1 TABLET BY MOUTH EVERY DAY    losartan (COZAAR) 25 mg tablet TAKE 1 TABLET BY MOUTH EVERY DAY    memantine (NAMENDA) 10 mg tablet TAKE 1 TABLET BY MOUTH TWICE A DAY    metFORMIN (GLUCOPHAGE) 1,000 mg tablet TAKE 1 TABLET BY MOUTH TWICE A DAY    tamsulosin (FLOMAX) 0.4 mg capsule TAKE 1 CAPSULE BY MOUTH EVERYDAY AT BEDTIME    donepeziL (ARICEPT) 10 mg tablet TAKE 1 TABLET BY MOUTH NIGHTLY    cholecalciferol (VITAMIN D3) (1000 Units /25 mcg) tablet Take 2 Tablets by mouth daily.  acetaminophen (TYLENOL) 500 mg tablet Take 2 Tablets by mouth two (2) times a day. Indications: backache    gabapentin (NEURONTIN) 100 mg capsule Take 1 Capsule by mouth nightly. Max Daily Amount: 100 mg. Indications: diabetic complication causing injury to some body nerves, neuropathic pain    Blood-Glucose Meter monitoring kit Use to check blood sugar three times weekly.  lancets misc Use to check blood sugar three times weekly.  glucose blood VI test strips (ASCENSIA AUTODISC VI, ONE TOUCH ULTRA TEST VI) strip Use to check blood sugar three times weekly and PRN.  carbamide peroxide (DEBROX) 6.5 % otic solution Administer 5 Drops into each ear two (2) times a day.  menthol-zinc oxide (CALMOSEPTINE) 0.44-20.6 % oint Apply 1 Each to affected area. Apply thin Layer to Sacral area as needed and after incontinence care   Indications: skin irritation    aspirin 81 mg chewable tablet Take 81 mg by mouth daily. Indications: stroke prevention     No current facility-administered medications for this visit. The Plan of Care has been initiated for within 15 days of New Visit  and reviewed and updated by the Interdisciplinary Group (IDG) as frequently as the patient condition warrants. Plan of Care by Discipline:    1.  Provider  Identify patient's health care goal(s), Ongoing evaluation of treatment interventions for specific disease state, Assessment of medication adverse effects, Reduction of polypharmacy within benefit/burden framework appropriate to age, function and disease state, Determine need for laboratory assessment based on patient disease status , Assess results of laboratory testing and adjust treatment plan as appropriate, Assess current Advance Care Planning documents and make ACP recommendations as appropriate, Discuss goals of care and treatment preferences, including resuscitation and Assess for Home Health and order as medically indicated    2. Nursing  Introduce Home Based Primary Care and Supportive Services, Review Emergency Preparedness Plan, Review Health Maintenance with patient and provider; update as appropriate, Education for skin care in patients with limited mobility; with focus on preventing skin breakdown and Provider caregiver / family support for patient's current and changing condition    3. Social Work  New patient assessment of psychosocial needs and social determinants of health, Assist in optimizing levels of psychosocial function, Assess patient for caregiver needs to optimize psychosocial function and safety, Provide information on available community resources and Assess current Advance Care Planning documents and make ACP recommendations as appropriate      Plan of Care Orders / Action Items:    Type 2 diabetes - HgA1C 8.8% in February 2022. Medications were adjusted (metformin increased from 500mg BID to 1000mg BID) in January prior to leaving the assisted living facility. Will maintain current regimen for now, with discussion today of possibly switching to Janumet if next HgA1C is not < 8.0%. Gabapentin 100mg QHS controlling his neuropathic pain; continue. Will attempt to collect microalbumin at next visit; unable to avoid today. Information left in the home today for in-home optometry services.  Not currently checking blood sugar regularly, but now they have the equipment they need should he need his glucose checked. Discussed possibly discontinuing atorvastatin, but as it is not currently causing difficulty and his independence would be significantly decreased if he were to have a stroke, will maintain for now. Asked caregiver to discuss with her brother. Alzheimer's Disease - Currently maintained on donepezil 10mg daily and namenda 10mg. No disturbing behaviors. Discussed allowing him to do as much for himself as possible; will continue to monitor and consider comprehensive cognitive assessment in the future. Hypertension - Blood pressure under excellent control today on diltiazem ER 120mg daily and losartan 25mg daily. No changes today. Hx of thyroid cancer/postablative hypothyroidism - TSH and FT4 at goal in February 2022; continue levothyroxine 112mcg daily. Recheck in 1 year. Hx of prostate CA - PSA checked February 2022 with PSA of 3.4. Discussed that this may be a benign finding as I have no other results to which to compare this number, but that urological referral/treatment may not align with goals of care. Chi Savage to discuss with her brother, but referral deferred today. Vitamin deficiencies - Previously on vit B12 and vit D supplementation from [facility]. Labs checked in February 2022; will discontinue B12 supplementation as this does not appear to be necessary but continue vit D supplementation to prevent deficiency, especially given fall risk. Impacted cerumen, bilateral - Bilateral curettage by this provider today yielded a moderate quantity of wax. Encouraged ongoing use of debrox 2 - 3x/week to prevent reaccumulation. Debility/history of recent fall - [facility]. Discussed the importance of maintaining as much function as possible. Stage II Pressure Ulcer - Skin now intact. Continue offloading pressure & calmoseptine. Improving. Chronic lumbar back pain - No known injury, has been present for years.  Did receive exercises from PT recently for his back, which help when he completes them; also having relief with TENS unit, when it is used. Encouraged frequent movement/ambulation as well, as this appears to help. 5/9 acute visit for follow up of DVT:  Acute DVT/On Oral Anticoagulation - Diagnosed with extensive DVT after LLE swelling, redness, and pain on 5/4/2022. Went to the ER, declined admission per his goals, but was started on eliquis starter pack. Now taking eliquis 10mg BID x 7 days, then transitioning to 5mg BID after that; improvement in symptoms already reported. No history of blood clots, per daughter. Will refill Eliquis 5mg BID to home pharmacy and refer to hematology/oncology for recommendations on duration of oral anticoagulation. Discussed signs/symptoms to warrant escalation in care, including signs of bleeding or symptoms suggestive of embolization of clot.      Plan for routine follow up as previous scheduled in June 2022.      Health Maintenance   Topic Date Due    MICROALBUMIN Q1  Never done    Eye Exam Retinal or Dilated  Never done    DTaP/Tdap/Td series (1 - Tdap) Never done    Shingrix Vaccine Age 50> (1 of 2) Never done    Pneumococcal 65+ years (2 - PCV) 12/14/2002    COVID-19 Vaccine (3 - Booster for Pfizer series) 07/17/2021    Flu Vaccine (Season Ended) 09/01/2022    Foot Exam Q1  02/18/2023    Lipid Screen  02/18/2023    Medicare Yearly Exam  02/19/2023    Depression Screen  04/15/2023         Estimated Visit Frequency:  Monthly Provider Visit  Last NP visit: 4/15/2022  SW visits PRN

## 2022-05-09 ENCOUNTER — HOME VISIT (OUTPATIENT)
Dept: FAMILY MEDICINE CLINIC | Age: 87
End: 2022-05-09
Payer: MEDICARE

## 2022-05-09 VITALS
SYSTOLIC BLOOD PRESSURE: 128 MMHG | OXYGEN SATURATION: 97 % | HEART RATE: 67 BPM | DIASTOLIC BLOOD PRESSURE: 66 MMHG | RESPIRATION RATE: 18 BRPM | TEMPERATURE: 95.4 F

## 2022-05-09 DIAGNOSIS — Z79.01 ON CONTINUOUS ORAL ANTICOAGULATION: ICD-10-CM

## 2022-05-09 DIAGNOSIS — I82.492 ACUTE DEEP VEIN THROMBOSIS (DVT) OF OTHER SPECIFIED VEIN OF LEFT LOWER EXTREMITY (HCC): Primary | ICD-10-CM

## 2022-05-09 DIAGNOSIS — Z66 DO NOT RESUSCITATE: ICD-10-CM

## 2022-05-09 PROBLEM — I82.409 DEEP VENOUS THROMBOSIS (HCC): Status: ACTIVE | Noted: 2022-05-09

## 2022-05-09 LAB
BACTERIA SPEC CULT: NORMAL
SERVICE CMNT-IMP: NORMAL

## 2022-05-09 PROCEDURE — 1101F PT FALLS ASSESS-DOCD LE1/YR: CPT | Performed by: NURSE PRACTITIONER

## 2022-05-09 PROCEDURE — 99349 HOME/RES VST EST MOD MDM 40: CPT | Performed by: NURSE PRACTITIONER

## 2022-05-09 PROCEDURE — G8428 CUR MEDS NOT DOCUMENT: HCPCS | Performed by: NURSE PRACTITIONER

## 2022-05-09 PROCEDURE — G8536 NO DOC ELDER MAL SCRN: HCPCS | Performed by: NURSE PRACTITIONER

## 2022-05-09 NOTE — PROGRESS NOTES
Home Based Primary Care [facility]) & Supportive Services    [phone]      NOTE: Home Based Primary Care is a PROVIDER (MD/NP) based interdisciplinary practice (RN, LCSW, Pharmacy, Dietician) for patients who cannot access (or have a taxing effort) primary / speciality medical care in an office setting. [facility] is NOT [employer] but works with [facility]. when there is a skilled need. Our MD/NPs are integral in Care Transitions; PLEASE CALL [phone] if this patient arrives in the Emergency Department or Hospital.        Date of Current Visit: 22  Patient/Family present for Current Visit: patient, daughter Marie Earl of Care as stated by the patient/family is: to remain at home, limit interventions     Preferences for hospitalization if that were to be necessary:  [] Patient DOES NOT WANT hospitalization; focus all treatments at home  [x] Patient WOULD WANT hospitalization for potentially reversible causes  Patient prefers hospitalization at: [facility]    Per daughter, Severiano Eric, she and her brother (who lives in [place] and is a PCP) share POA for their dad. Per Severiano Eric, he wishes to only go to the hospital \"if I'm bleeding or have a broken bone. \"       Olvin Posada (: 10/7/1931) is a 80 y.o. male, established patient, here for evaluation of the following chief complaint(s):  Hospital Follow Up       ASSESSMENT/PLAN:  Below is the assessment and plan developed based on review of pertinent history, physical exam, labs, studies, and medications. 1. Acute deep vein thrombosis (DVT) of other specified vein of left lower extremity (HCC)  -     REFERRAL TO HEMATOLOGY ONCOLOGY  -     apixaban (ELIQUIS) 5 mg tablet; Take 1 Tablet by mouth two (2) times a day. Indications: blood clot in a deep vein of the extremities, Normal, Disp-60 Tablet, R-2  2. On continuous oral anticoagulation  -     REFERRAL TO HEMATOLOGY ONCOLOGY  -     apixaban (ELIQUIS) 5 mg tablet;  Take 1 Tablet by mouth two (2) times a day. Indications: blood clot in a deep vein of the extremities, Normal, Disp-60 Tablet, R-2  3. Do not resuscitate  -     DO NOT RESUSCITATE    Acute DVT/On Oral Anticoagulation - Diagnosed with extensive DVT after LLE swelling, redness, and pain on 5/4/2022. Went to the ER, declined admission per his goals, but was started on eliquis starter pack. Now taking eliquis 10mg BID x 7 days, then transitioning to 5mg BID after that; improvement in symptoms already reported. No history of blood clots, per daughter. Will refill Eliquis 5mg BID to home pharmacy and refer to hematology/oncology for recommendations on duration of oral anticoagulation. Discussed signs/symptoms to warrant escalation in care, including signs of bleeding or symptoms suggestive of embolization of clot. Plan for routine follow up as previous scheduled in June 2022.                 Health Maintenance Due   Topic Date Due    COVID-19 Vaccine (1) Never done    DTaP/Tdap/Td series (1 - Tdap) Never done    Shingrix Vaccine Age 50> (1 of 2) Never done    Pneumococcal 65+ years (1 of 1 - PPSV23) Never done    Flu Vaccine (1) Never done              Return in 4 weeks (on 6/6/2022) for routine follow up with labs. SUBJECTIVE/OBJECTIVE:    The patient is seen today in his home due to taxing effort to seek primary care services in the community. Patient is seen today for hospital follow up after going to the ER on 5/4/2022 for extensive DVT. Per his daughter, Mario Cruz, who provides the history today due to patient's Alzheimer's and being hard-of-hearing, he developed left lower extremity swelling, redness, and pain about 10 days ago without known cause. They called [facility], who ordered a doppler study. This was done at [facility] and yielded the following findings:  Acute occlusive thrombus present in the left common femoral vein.  Acute occlusive thrombus present in the left great saphenous thigh vein. Acute occlusive thrombus present in the left great saphenous calf vein. Acute occlusive thrombus present in the left profunda femoral vein. Acute occlusive thrombus present in the left femoral vein. [facility] he then proceeded to the ER where he was offered admission but declined; he was started on eliquis 10mg BID and discharged home. His daughter reports that the swelling and pain have already begun to improve since starting the Eliquis and there is no evidence of bleeding. They are wondering how long the anticoagulation needs to be continued; he has never had a clot previously, though his daughter reports that his son had a DVT approximately 10 years ago.      REVIEW OF SYSTEMS:     ROS  Constitutional: denies activity change, appetite change, fatigue, fever  HEENT: denies congestion, sinus pressure, sore throat; endorses hearing loss  CV: denies chest pain, palpitations, edema  Respiratory: denies shortness of breath, wheezing  GI: denies abdominal pain, blood in stool, diarrhea, constipation  MSK: denies arthralgias, joint swelling; endorses chronic left-sided lumbar back pain; + LLE swelling, redness, & pain  Neuro: denies frequent headaches, dizziness, numbness/tingling  Skin: denies skin breakdown  Psych: denies depression, anxiety, confusion, endorses chronic memory changes         FUNCTIONAL ASSESSMENT:     Palliative Performance Scale (PPS): 50     PSYCHOSOCIAL/SPIRITUAL SCREENING:      Any spiritual / Cheondoism concerns: [] Yes /  [x] No     Caregiver Burnout: [] Yes /  [x] No /  [] No Caregiver Present       PHYSICAL EXAM:       Visit Vitals  BP (!) 106/58 (BP 1 Location: Left upper arm, BP Patient Position: Sitting, BP Cuff Size: Adult)   Pulse 73   Temp (!) 96.7 °F (35.9 °C) (Temporal)   Resp 20   Wt 175 lb 1.6 oz (79.4 kg)   SpO2 96%       Constitutional: no acute distress, pleasant, well-groomed  male; overweight; ambulates with rollator  HEENT: normocephalic, atraumatic, external ears normal bilaterally; moist mucous membranes; conjugate gaze  CV: regular rate and rhythm, no murmurs noted  Pulm: normal effort, normal breath sounds without wheezing or rhonchi  Abd: normal bowel sounds, soft, non-tender  : deferred  Skin: warm, dry; LLE swelling with mild streaking erythema, TTP over linear presumed clot to anterior aspect of left shin  Neuro: A & O x 2; mental status at baseline; remote memory intact  Psych: mood and behavior normal           Current Outpatient Medications on File Prior to Visit   Medication Sig Dispense Refill    atorvastatin (LIPITOR) 10 mg tablet TAKE 1 TABLET BY MOUTH EVERYDAY AT BEDTIME 30 Tablet 1    dilTIAZem ER (CARDIZEM CD) 120 mg capsule TAKE 1 CAPSULE BY MOUTH EVERY DAY 30 Capsule 1    levothyroxine (SYNTHROID) 112 mcg tablet TAKE 1 TABLET BY MOUTH EVERY DAY 30 Tablet 1    losartan (COZAAR) 25 mg tablet TAKE 1 TABLET BY MOUTH EVERY DAY 30 Tablet 1    memantine (NAMENDA) 10 mg tablet TAKE 1 TABLET BY MOUTH TWICE A DAY 60 Tablet 1    metFORMIN (GLUCOPHAGE) 1,000 mg tablet TAKE 1 TABLET BY MOUTH TWICE A DAY 60 Tablet 1    tamsulosin (FLOMAX) 0.4 mg capsule TAKE 1 CAPSULE BY MOUTH EVERYDAY AT BEDTIME 30 Capsule 1    donepeziL (ARICEPT) 10 mg tablet TAKE 1 TABLET BY MOUTH NIGHTLY 30 Tablet 5    cholecalciferol (VITAMIN D3) (1000 Units /25 mcg) tablet Take 2 Tablets by mouth daily. 90 Tablet 1    acetaminophen (TYLENOL) 500 mg tablet Take 2 Tablets by mouth two (2) times a day. Indications: backache 360 Tablet 1    gabapentin (NEURONTIN) 100 mg capsule Take 1 Capsule by mouth nightly. Max Daily Amount: 100 mg. Indications: diabetic complication causing injury to some body nerves, neuropathic pain 90 Capsule 1    Blood-Glucose Meter monitoring kit Use to check blood sugar three times weekly. 1 Kit 0    lancets misc Use to check blood sugar three times weekly.  1 Each 11    glucose blood VI test strips (ASCENSIA AUTODISC VI, ONE TOUCH ULTRA TEST VI) strip Use to check blood sugar three times weekly and PRN. 30 Strip 11    carbamide peroxide (DEBROX) 6.5 % otic solution Administer 5 Drops into each ear two (2) times a day. 30 mL 0    menthol-zinc oxide (CALMOSEPTINE) 0.44-20.6 % oint Apply 1 Each to affected area. Apply thin Layer to Sacral area as needed and after incontinence care   Indications: skin irritation      aspirin 81 mg chewable tablet Take 81 mg by mouth daily. Indications: stroke prevention       No current facility-administered medications on file prior to visit. Advanced Care Planning    Primary Decision Maker (Active): Luis Muñoz - Daughter - [phone]    No flowsheet data found. On this date 05/09/2022 I have spent 45 minutes reviewing previous notes, test results and face to face with the patient discussing the diagnosis and importance of compliance with the treatment plan as well as documenting on the day of the visit. An electronic signature was used to authenticate this note.   -- Jossy Boyce, MINDA

## 2022-05-09 NOTE — PATIENT INSTRUCTIONS
Deep Vein Thrombosis: Care Instructions  Overview     A deep vein thrombosis (DVT) is a blood clot in certain veins, usually in the legs, pelvis, or arms. Blood clots in these veins need to be treated because they can get bigger, break loose, and travel through the bloodstream to the lungs. A blood clot in a lung can be life-threatening. The doctor may have given you a blood thinner (anticoagulant). A blood thinner can stop the blood clot from growing larger and prevent new clots from forming. You will need to take a blood thinner for at least 3 months. The doctor has checked you carefully, but problems can develop later. If you notice any problems or new symptoms, get medical treatment right away. Follow-up care is a key part of your treatment and safety. Be sure to make and go to all appointments, and call your doctor if you are having problems. It's also a good idea to know your test results and keep a list of the medicines you take. How can you care for yourself at home? · Take your medicines exactly as prescribed. Call your doctor if you think you are having a problem with your medicine. · If you are taking a blood thinner, be sure you get instructions about how to take your medicine safely. Blood thinners can cause serious bleeding problems. · Try to walk several times a day. · Wear compression stockings if your doctor recommends them. These stockings are tighter at the feet than on the legs. They may reduce pain and swelling in your legs. But there are different types of stockings, and they need to fit right. So your doctor will recommend what you need. · When you sit, use a pillow to raise the arm or leg that has the blood clot. Try to keep it above the level of your heart. When should you call for help? Call 911 anytime you think you may need emergency care.  For example, call if:    · You passed out (lost consciousness).     · You have symptoms of a blood clot in your lung (called a pulmonary embolism). These include:  ? Sudden chest pain. ? Trouble breathing. ? Coughing up blood. Call your doctor now or seek immediate medical care if:    · You have new or worse trouble breathing.     · You are dizzy or lightheaded, or you feel like you may faint.     · You have symptoms of a blood clot in your arm or leg. These may include:  ? Pain in the arm, calf, back of the knee, thigh, or groin. ? Redness and swelling in the arm, leg, or groin. Watch closely for changes in your health, and be sure to contact your doctor if:    · You do not get better as expected. Where can you learn more? Go to http://www.gray.com/  Enter H946 in the search box to learn more about \"Deep Vein Thrombosis: Care Instructions. \"  Current as of: July 6, 2021               Content Version: 13.2  © 2006-2022 [employer]. Care instructions adapted under license by [employer] (which disclaims liability or warranty for this information). If you have questions about a medical condition or this instruction, always ask your healthcare professional. [place] [address] any warranty or liability for your use of this information.

## 2022-05-27 DIAGNOSIS — E55.9 VITAMIN D DEFICIENCY: ICD-10-CM

## 2022-05-27 RX ORDER — MELATONIN
2000 DAILY
Qty: 60 TABLET | Refills: 2 | Status: SHIPPED | OUTPATIENT
Start: 2022-05-27 | End: 2022-08-30

## 2022-05-27 RX ORDER — DILTIAZEM HYDROCHLORIDE 120 MG/1
CAPSULE, COATED, EXTENDED RELEASE ORAL
Qty: 30 CAPSULE | Refills: 1 | Status: SHIPPED | OUTPATIENT
Start: 2022-05-27 | End: 2022-07-26

## 2022-05-27 RX ORDER — TAMSULOSIN HYDROCHLORIDE 0.4 MG/1
CAPSULE ORAL
Qty: 30 CAPSULE | Refills: 1 | Status: SHIPPED | OUTPATIENT
Start: 2022-05-27 | End: 2022-06-29 | Stop reason: SDUPTHER

## 2022-05-27 RX ORDER — MEMANTINE HYDROCHLORIDE 10 MG/1
TABLET ORAL
Qty: 60 TABLET | Refills: 1 | Status: SHIPPED | OUTPATIENT
Start: 2022-05-27 | End: 2022-07-26

## 2022-05-27 RX ORDER — ATORVASTATIN CALCIUM 10 MG/1
TABLET, FILM COATED ORAL
Qty: 30 TABLET | Refills: 1 | Status: SHIPPED | OUTPATIENT
Start: 2022-05-27 | End: 2022-06-29 | Stop reason: SDUPTHER

## 2022-05-27 RX ORDER — LEVOTHYROXINE SODIUM 112 UG/1
TABLET ORAL
Qty: 30 TABLET | Refills: 1 | Status: SHIPPED | OUTPATIENT
Start: 2022-05-27 | End: 2022-07-26

## 2022-05-27 RX ORDER — METFORMIN HYDROCHLORIDE 1000 MG/1
TABLET ORAL
Qty: 60 TABLET | Refills: 1 | Status: SHIPPED | OUTPATIENT
Start: 2022-05-27 | End: 2022-07-26

## 2022-05-27 RX ORDER — LOSARTAN POTASSIUM 25 MG/1
TABLET ORAL
Qty: 30 TABLET | Refills: 1 | Status: SHIPPED | OUTPATIENT
Start: 2022-05-27 | End: 2022-07-26

## 2022-06-01 ENCOUNTER — TELEPHONE (OUTPATIENT)
Dept: FAMILY MEDICINE CLINIC | Age: 87
End: 2022-06-01

## 2022-06-01 NOTE — TELEPHONE ENCOUNTER
Telephone call from [facility] reporting that she and her  both have Covid. Patient reports feeling really Terris Netter and is sleeping constantly and voiding little. Advised that they should call [facility] to come assess patient and test for Covid. We discussed Paxlovid which dgt stated she would be starting today. She asked if [facility] would prescribe Paxlovid and this nurse advised she is unsure. Patient is without resp symptoms, no SOB, no fever at this time.

## 2022-06-02 ENCOUNTER — TELEPHONE (OUTPATIENT)
Dept: FAMILY MEDICINE CLINIC | Age: 87
End: 2022-06-02

## 2022-06-02 NOTE — TELEPHONE ENCOUNTER
Call placed to patient's daughter, Rashid Andujar to follow up with Mr. Contreras Haile condition. She reports [facility] was called, patient had a PCR test yesterday and they are waiting the results. Patient is feeling better, but still low urine output reported by daughter. She verbalizes they are encouraging fluids (water/Gatorade) as much as patient is able to drink, and that seems to be helping. Rashid Andujar reports that [facility] would not prescribe Paxlovid and she plans to have patient repeat the rapid test tomorrow if they don't receive the PCR results then, she would inform us of the results. Daughter is agreeable with rescheduling the visit with NP from Monday, 6/6. She will call back to write down the new visit date (6/16) as she was in a meeting when I placed the call.

## 2022-06-16 ENCOUNTER — HOME VISIT (OUTPATIENT)
Dept: FAMILY MEDICINE CLINIC | Age: 87
End: 2022-06-16
Payer: MEDICARE

## 2022-06-16 VITALS
RESPIRATION RATE: 18 BRPM | OXYGEN SATURATION: 97 % | SYSTOLIC BLOOD PRESSURE: 128 MMHG | TEMPERATURE: 96.3 F | HEART RATE: 64 BPM | DIASTOLIC BLOOD PRESSURE: 70 MMHG

## 2022-06-16 DIAGNOSIS — E11.65 TYPE 2 DIABETES MELLITUS WITH HYPERGLYCEMIA, WITHOUT LONG-TERM CURRENT USE OF INSULIN (HCC): ICD-10-CM

## 2022-06-16 DIAGNOSIS — I10 PRIMARY HYPERTENSION: ICD-10-CM

## 2022-06-16 DIAGNOSIS — Z71.89 ADVANCED CARE PLANNING/COUNSELING DISCUSSION: ICD-10-CM

## 2022-06-16 DIAGNOSIS — Z79.01 ON CONTINUOUS ORAL ANTICOAGULATION: ICD-10-CM

## 2022-06-16 DIAGNOSIS — E11.65 TYPE 2 DIABETES MELLITUS WITH HYPERGLYCEMIA, WITHOUT LONG-TERM CURRENT USE OF INSULIN (HCC): Primary | ICD-10-CM

## 2022-06-16 DIAGNOSIS — I82.492 ACUTE DEEP VEIN THROMBOSIS (DVT) OF OTHER SPECIFIED VEIN OF LEFT LOWER EXTREMITY (HCC): ICD-10-CM

## 2022-06-16 DIAGNOSIS — F02.80 ALZHEIMER'S DISEASE (HCC): ICD-10-CM

## 2022-06-16 DIAGNOSIS — G30.9 ALZHEIMER'S DISEASE (HCC): ICD-10-CM

## 2022-06-16 DIAGNOSIS — E89.0 POSTABLATIVE HYPOTHYROIDISM: ICD-10-CM

## 2022-06-16 PROCEDURE — G8427 DOCREV CUR MEDS BY ELIG CLIN: HCPCS | Performed by: NURSE PRACTITIONER

## 2022-06-16 PROCEDURE — 3044F HG A1C LEVEL LT 7.0%: CPT | Performed by: NURSE PRACTITIONER

## 2022-06-16 PROCEDURE — 99349 HOME/RES VST EST MOD MDM 40: CPT | Performed by: NURSE PRACTITIONER

## 2022-06-16 PROCEDURE — G8536 NO DOC ELDER MAL SCRN: HCPCS | Performed by: NURSE PRACTITIONER

## 2022-06-16 PROCEDURE — 1101F PT FALLS ASSESS-DOCD LE1/YR: CPT | Performed by: NURSE PRACTITIONER

## 2022-06-16 PROCEDURE — 1123F ACP DISCUSS/DSCN MKR DOCD: CPT | Performed by: NURSE PRACTITIONER

## 2022-06-16 NOTE — PROGRESS NOTES
Home Based Primary Care [facility]) & Supportive Services    [phone]      NOTE: Home Based Primary Care is a PROVIDER (MD/NP) based interdisciplinary practice (RN, LCSW) for patients who cannot access (or have a taxing effort) primary / speciality medical care in an office setting. \[facility] [place] [facility]\"" is NOT [facility] but works with [address] when there is a skilled need. Our MD/NPs are integral in Care Transitions; PLEASE CALL [phone] if this patient arrives in the Emergency Department or Hospital.        Date of Current Visit: 22  Patient/Family present for Current Visit: patient, daughter Murphy Hawking of Care as stated by the patient/family is: to remain at home, limit interventions     Preferences for hospitalization if that were to be necessary:  [] Patient DOES NOT WANT hospitalization; focus all treatments at home  [x] Patient WOULD WANT hospitalization for potentially reversible causes  Patient prefers hospitalization at: [facility]    Per daughter, Savi Nuno, she and her brother (who lives in [place] and is a PCP) share POA for their dad. Per [place] [facility], he wishes to only go to the hospital \"if I'm bleeding or have a broken bone. \"       Aisha Barry (: 10/7/1931) is a 80 y.o. male, established patient, here for evaluation of the following chief complaint(s):  Follow Up Chronic Condition       ASSESSMENT/PLAN:  Below is the assessment and plan developed based on review of pertinent history, physical exam, labs, studies, and medications. 1. Type 2 diabetes mellitus with hyperglycemia, without long-term current use of insulin (HCC)  -     HEMOGLOBIN A1C WITH EAG; Future  -     CBC W/O DIFF; Future  -     METABOLIC PANEL, COMPREHENSIVE; Future  2. Alzheimer's disease ([facility] [address]  3. Postablative hypothyroidism  4. Primary hypertension  5. Acute deep vein thrombosis (DVT) of other specified vein of left lower extremity ([address]  6. On continuous oral anticoagulation  7. Advanced care planning/counseling discussion         Type 2 diabetes - HgA1C 6.9% today, down from 8.8% in February 2022. Continue metformin 1000mg BID. Gabapentin 100mg QHS controlling his neuropathic pain; continue. Will attempt to collect microalbumin at next visit; unable to avoid today. Information left in the home today for in-home optometry services. Checking blood sugars approximately once weekly, which have been okay. Will obtain controlled substance agreement & urine toxicology screen at next visit. Acute DVT/On Chronic Anticoagulation - Currently taking eliquis 5mg BID for acute left leg DVT, diagnosed May 2022; no signs of side effects at this time. Has heme-onc appt in July to determine duration of anticoagulation. Alzheimer's Disease - Currently maintained on donepezil 10mg daily and namenda 10mg. No disturbing behaviors. Discussed allowing him to do as much for himself as possible; will continue to monitor and consider comprehensive cognitive assessment in the future. Hypertension - Blood pressure under excellent control today on diltiazem ER 120mg daily and losartan 25mg daily. No changes today. Hx of thyroid cancer/postablative hypothyroidism - TSH and FT4 at goal in February 2022; continue levothyroxine 112mcg daily. Recheck in 1 year. Hx of prostate CA - PSA checked February 2022 with PSA of 3.4. Discussed that this may be a benign finding as I have no other results to which to compare this number, but that urological referral/treatment may not align with goals of care. Referral to urology declined. Vitamin deficiencies - Previously on vit B12 and vit D supplementation from [facility]. Labs checked in February 2022; will discontinue B12 supplementation as this does not appear to be necessary but continue vit D supplementation to prevent deficiency, especially given fall risk. Debility/history of recent fall - [facility].  Discussed the importance of maintaining as much function as possible. Recent mechanical fall; encouraged using rollator any time he is ambulating. Chronic lumbar back pain - No known injury, has been present for years. Did receive exercises from PT recently for his back, which help when he completes them; also having relief with TENS unit, when it is used. Encouraged frequent movement/ambulation as well, as this appears to help. Using tylenol 2000mg daily. Advance Care Planning - POST form completed today and uploaded to media. Due for covid-19 booster; phone number left in the home today for daughter to schedule home Covid-19 booster.                 Health Maintenance Due   Topic Date Due    COVID-19 Vaccine (1) Never done    DTaP/Tdap/Td series (1 - Tdap) Never done    Shingrix Vaccine Age 50> (1 of 2) Never done    Pneumococcal 65+ years (1 of 1 - PPSV23) Never done    Flu Vaccine (1) Never done              Return in 3 months (on 2022) for 3 month follow up. SUBJECTIVE/OBJECTIVE:      The patient is seen today in his home due to taxing effort to seek primary care services in the community due to impaired mobility and advanced dementia. The whole family recently had Covid-19, but Mr. Cristian Sloan never tested positive or developed symptoms. He is vaccinated and ready for his second booster. Mr. Cristian Sloan is a retired [employer] , who moved to [place] in 2022 to live with his daughter, Rashid Andujar. He was previously living in an assisted living facility in [facility] for approximately 4 years, after his wife . He was unable to care for himself so moved to the assisted living facility, and was moved to [place] to be closer to his daughter and her family. He has 4 hours of caregiving each day through his VA benefits, which his daughter Rashid Andujar reports today is plenty. He ambulates around the home with a rolling walker, and uses a wheelchair if he goes out, which is usually about every 2 weeks to go to a restaurant.  He has had 2 falls in the past year, but none with injury. He is reportedly fully vaccinated against Covid-19, including a booster, but his card is at the [facility] in [facility]; his daughter has not had success getting this from the [facility]. A typical day includes him waking up around 8:30am, showering with help from his aide, and eating breakfast. He'll sit in the living room and watch television until lunch, then nap for about an hour. He eats dinner around 6:30pm and goes to bed around 8/9pm, sleeping well through the night. He was diagnosed with acute DVT in early May 2022 after developing acute left leg swelling and pain. He was seen by [facility] and sent for an ultrasound, which confirmed DVT. He was started on eliquis by the ER and has had improvement in symptoms since that time, with very minimal swelling. He has a visit scheduled in July 2022 with Dr. Yuko May (heme-onc) to discuss duration of anticoagulation. He has a history of hypertension, currently taking diltiazem ER 120mg and losartan 25mg daily. His daughter denies history of MI or stroke. Home blood pressures are not routinely checked. He has a history of prostate cancer, diagnosed in approximately 2002, for which he did radiation therapy. PSA was elevated at 3.4 in February 2022; at this point, evaluation is being deferred. He has a history of thyroid cancer, diagnosed in approximately 1998. He had thyroid ablation and currently takings levothyroxine 112mcg daily. TFTs were at goal in February 2022. He has type 2 diabetes with diabetic neuropathy. He takes metformin 1000mg BID, increased in January 2022 while at the assisted living facility, and takes gabapentin 100mg QHS for neuropathic pain, which is effective. Blood sugars are not routinely checked, though they do now have a glucometer and testing supplies.  He has not had an eye exam in approximately 4 years; information was left in the home to schedule in-home optometry exam at previous visit. He also has Alzheimer's disease, diagnosed in approximately 2014. He takes donepezil 10mg nightly along with namenda 10mg. His daughter reports that he does not have any behavior disturbances and sleeps well, describing his symptoms as \"pleasantly confused. \"    He is incontinent of urine and stool, but is able to change his brief with some assistance and prompting. He is able to feed himself when food is prepared for him and has a hearty appetite. He sleeps well. His daughter and son share POA (both medical and financial); this documentation has been uploaded. He has now completed PT for his gait instability and low back pain. He has some relief from the pain when doing the exercises and using the TENS unit, though it can be somewhat inconvenient to hook him up so he doesn't do it often. He has not had any falls since last visit and continues to walk with the rollator.      REVIEW OF SYSTEMS:     ROS  Constitutional: denies activity change, appetite change, fatigue, fever  HEENT: denies congestion, sinus pressure, sore throat; endorses hearing loss  CV: denies chest pain, palpitations, edema  Respiratory: denies shortness of breath, wheezing  GI: denies abdominal pain, blood in stool, diarrhea, constipation  MSK: denies arthralgias, joint swelling; endorses chronic left-sided lumbar back pain  Neuro: denies frequent headaches, dizziness, numbness/tingling  Skin: denies skin breakdown  Psych: denies depression, anxiety, confusion, endorses chronic memory changes         FUNCTIONAL ASSESSMENT:     Palliative Performance Scale (PPS): 50     PSYCHOSOCIAL/SPIRITUAL SCREENING:      Any spiritual / Cheondoism concerns: [] Yes /  [x] No     Caregiver Burnout: [] Yes /  [x] No /  [] No Caregiver Present       PHYSICAL EXAM:       Visit Vitals  BP (!) 106/58 (BP 1 Location: Left upper arm, BP Patient Position: Sitting, BP Cuff Size: Adult)   Pulse 73   Temp (!) 96.7 °F (35.9 °C) (Temporal)   Resp 20   Wt 175 lb 1.6 oz (79.4 kg)   SpO2 96%       Constitutional: no acute distress, pleasant, well-groomed  male; overweight; ambulates with rollator; [facility]  HEENT: normocephalic, atraumatic, external ears normal bilaterally; moist mucous membranes; conjugate gaze  CV: regular rate and rhythm, no murmurs noted; no dependent swelling  Pulm: normal effort, normal breath sounds without wheezing or rhonchi  Abd: normal bowel sounds, soft, non-tender  : deferred  Skin: warm, dry; no skin breakdown noted  Neuro: A & O x 2; mental status at baseline; remote memory intact but otherwise pleasantly confused  Psych: mood and behavior normal           Current Outpatient Medications on File Prior to Visit   Medication Sig Dispense Refill    cholecalciferol (VITAMIN D3) (1000 Units /25 mcg) tablet TAKE 2 TABLETS BY MOUTH DAILY 60 Tablet 2    dilTIAZem ER (CARDIZEM CD) 120 mg capsule TAKE 1 CAPSULE BY MOUTH EVERY DAY 30 Capsule 1    atorvastatin (LIPITOR) 10 mg tablet TAKE 1 TABLET BY MOUTH EVERYDAY AT BEDTIME 30 Tablet 1    metFORMIN (GLUCOPHAGE) 1,000 mg tablet TAKE 1 TABLET BY MOUTH TWICE A DAY 60 Tablet 1    levothyroxine (SYNTHROID) 112 mcg tablet TAKE 1 TABLET BY MOUTH EVERY DAY 30 Tablet 1    losartan (COZAAR) 25 mg tablet TAKE 1 TABLET BY MOUTH EVERY DAY 30 Tablet 1    tamsulosin (FLOMAX) 0.4 mg capsule TAKE 1 CAPSULE BY MOUTH EVERYDAY AT BEDTIME 30 Capsule 1    memantine (NAMENDA) 10 mg tablet TAKE 1 TABLET BY MOUTH TWICE A DAY 60 Tablet 1    apixaban (ELIQUIS) 5 mg tablet Take 1 Tablet by mouth two (2) times a day. Indications: blood clot in a deep vein of the extremities 60 Tablet 2    donepeziL (ARICEPT) 10 mg tablet TAKE 1 TABLET BY MOUTH NIGHTLY 30 Tablet 5    acetaminophen (TYLENOL) 500 mg tablet Take 2 Tablets by mouth two (2) times a day. Indications: backache 360 Tablet 1    gabapentin (NEURONTIN) 100 mg capsule Take 1 Capsule by mouth nightly. Max Daily Amount: 100 mg.  Indications: diabetic complication causing injury to some body nerves, neuropathic pain 90 Capsule 1    Blood-Glucose Meter monitoring kit Use to check blood sugar three times weekly. 1 Kit 0    lancets misc Use to check blood sugar three times weekly. 1 Each 11    glucose blood VI test strips (ASCENSIA AUTODISC VI, ONE TOUCH ULTRA TEST VI) strip Use to check blood sugar three times weekly and PRN. 30 Strip 11    carbamide peroxide (DEBROX) 6.5 % otic solution Administer 5 Drops into each ear two (2) times a day. 30 mL 0    menthol-zinc oxide (CALMOSEPTINE) 0.44-20.6 % oint Apply 1 Each to affected area. Apply thin Layer to Sacral area as needed and after incontinence care   Indications: skin irritation      aspirin 81 mg chewable tablet Take 81 mg by mouth daily. Indications: stroke prevention       No current facility-administered medications on file prior to visit. Advanced Care Planning    Primary Decision Maker (Active): Tommy Campos Daughter - [phone]    Code Status: DNR  Advanced Medical Directive: POST form completed June 2022      On this date 06/16/2022 I have spent 60 minutes reviewing previous notes, test results, drawing blood and face to face with the patient and his daughter discussing the diagnosis and importance of compliance with the treatment plan as well as documenting on the day of the visit. An electronic signature was used to authenticate this note.   -- Anabell Altamirano NP

## 2022-06-17 LAB
ERYTHROCYTE [DISTWIDTH] IN BLOOD BY AUTOMATED COUNT: 13.2 % (ref 11.5–14.5)
EST. AVERAGE GLUCOSE BLD GHB EST-MCNC: 151 MG/DL
HBA1C MFR BLD: 6.9 % (ref 4–5.6)
HCT VFR BLD AUTO: 40.5 % (ref 36.6–50.3)
HGB BLD-MCNC: 12.7 G/DL (ref 12.1–17)
MCH RBC QN AUTO: 31.5 PG (ref 26–34)
MCHC RBC AUTO-ENTMCNC: 31.4 G/DL (ref 30–36.5)
MCV RBC AUTO: 100.5 FL (ref 80–99)
NRBC # BLD: 0 K/UL (ref 0–0.01)
NRBC BLD-RTO: 0 PER 100 WBC
PLATELET # BLD AUTO: 356 K/UL (ref 150–400)
PMV BLD AUTO: 9.8 FL (ref 8.9–12.9)
RBC # BLD AUTO: 4.03 M/UL (ref 4.1–5.7)
WBC # BLD AUTO: 7.6 K/UL (ref 4.1–11.1)

## 2022-06-21 NOTE — PROGRESS NOTES
Advance Care Planning     General Advance Care Planning (ACP) Conversation      Date of Conversation: 6/16/2022  Conducted with: Healthcare Decision Maker: Named in Advance Directive or Healthcare Power of [address]:     Primary Decision Maker (Active): Gloria Ryan - Daughter - [phone]  Click here to complete [address] including selection of the Healthcare Decision Maker Relationship (ie \"Primary\")      Today we completed POST form    Content/Action Overview: Has ACP document(s) on file - reflects the patient's care preferences  Reviewed DNR/DNI and patient confirms current DNR status - completed forms on file (place new order if needed)  Topics discussed: treatment goals, benefit/burden of treatment options, artificial nutrition, ventilation preferences, hospitalization preferences, resuscitation preferences and end of life care preferences (vegetative state/imminent death)  Additional Comments: Patient's daughter Rosie Velarde, who shares POA with her brother, completed POST form today. They do not want hospitalization, feeding tube, CPR, or ventilation. POST form completed and uploaded to media.      Length of Voluntary ACP Conversation in minutes:  20 minutes    Maureen Acuña NP

## 2022-06-21 NOTE — PATIENT INSTRUCTIONS
Advance Directives: Care Instructions  Overview  An advance directive is a legal way to state your wishes at the end of your life. It tells your family and your doctor what to do if you can't say what you want. There are two main types of advance directives. You can change them any time your wishes change. Living will. This form tells your family and your doctor your wishes about life support and other treatment. The form is also called a declaration. Medical power of . This form lets you name a person to make treatment decisions for you when you can't speak for yourself. This person is called a health care agent (health care proxy, health care surrogate). The form is also called a durable power of  for health care. If you do not have an advance directive, decisions about your medical care may be made by a family member, or by a doctor or a  who doesn't know you. It may help to think of an advance directive as a gift to the people who care for you. If you have one, they won't have to make tough decisions by themselves. Follow-up care is a key part of your treatment and safety. Be sure to make and go to all appointments, and call your doctor if you are having problems. It's also a good idea to know your test results and keep a list of the medicines you take. What should you include in an advance directive? Many states have a unique advance directive form. (It may ask you to address specific issues.) Or you might use a universal form that's approved by many states. If your form doesn't tell you what to address, it may be hard to know what to include in your advance directive. Use the questions below to help you get started. · Who do you want to make decisions about your medical care if you are not able to? · What life-support measures do you want if you have a serious illness that gets worse over time or can't be cured? · What are you most afraid of that might happen?  (Maybe you're afraid of having pain, losing your independence, or being kept alive by machines.)  · Where would you prefer to die? (Your home? A hospital? A nursing home?)  · Do you want to donate your organs when you die? · Do you want certain Christianity practices performed before you die? When should you call for help? Be sure to contact your doctor if you have any questions. Where can you learn more? Go to http://www.gray.com/  Enter R264 in the search box to learn more about \"Advance Directives: Care Instructions. \"  Current as of: October 18, 2021               Content Version: 13.2  © 8088-5130 [employer]. Care instructions adapted under license by [employer] (which disclaims liability or warranty for this information). If you have questions about a medical condition or this instruction, always ask your healthcare professional. [address] any warranty or liability for your use of this information.

## 2022-06-22 ENCOUNTER — DOCUMENTATION ONLY (OUTPATIENT)
Dept: FAMILY MEDICINE CLINIC | Age: 87
End: 2022-06-22

## 2022-06-22 NOTE — PROGRESS NOTES
Home Based Primary Care  Plan of Care    \[facility]\"" Team Members: Myrtle Atwood MD; Annmarie Hunt NP; Gino Goldman, NP; Belkys Garvin, RN; Teja Petty, RN; Gretel Bui, HUONG; SUDHA Houston  10/7/1931 / [id number]  male    The physician has reviewed and discussed the plan of care with the interdisciplinary group on 07/05/22 and agrees. 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 male who was referred to the [facility] program due to taxing effort to seek primary care services in the community. Patient discussed today for 60 day POC review. Advance Care Planning:  Code status: DNR      Primary Decision Maker (Active): Ladd Ormond - [place] - [phone]   No flowsheet data found.     DME/Supplies:  Rolling walker     No Known Allergies    Nutritional Requirements:   Oral with supported meal preparation    Functional/Activity Level:  Ambulatory with assistance of cane, walker, or support of another person (significant impairment)    Safety Measures:   Fall risk and Self-care deficity    Acuity Level Rating: Low    Current Outpatient Medications   Medication Sig    cholecalciferol (VITAMIN D3) (1000 Units /25 mcg) tablet TAKE 2 TABLETS BY MOUTH DAILY    dilTIAZem ER (CARDIZEM CD) 120 mg capsule TAKE 1 CAPSULE BY MOUTH EVERY DAY    atorvastatin (LIPITOR) 10 mg tablet TAKE 1 TABLET BY MOUTH EVERYDAY AT BEDTIME    metFORMIN (GLUCOPHAGE) 1,000 mg tablet TAKE 1 TABLET BY MOUTH TWICE A DAY    levothyroxine (SYNTHROID) 112 mcg tablet TAKE 1 TABLET BY MOUTH EVERY DAY    losartan (COZAAR) 25 mg tablet TAKE 1 TABLET BY MOUTH EVERY DAY    tamsulosin (FLOMAX) 0.4 mg capsule TAKE 1 CAPSULE BY MOUTH EVERYDAY AT BEDTIME    memantine (NAMENDA) 10 mg tablet TAKE 1 TABLET BY MOUTH TWICE A DAY    apixaban (ELIQUIS) 5 mg tablet Take 1 Tablet by mouth two (2) times a day. Indications: blood clot in a deep vein of the extremities    donepeziL (ARICEPT) 10 mg tablet TAKE 1 TABLET BY MOUTH NIGHTLY    acetaminophen (TYLENOL) 500 mg tablet Take 2 Tablets by mouth two (2) times a day. Indications: backache    gabapentin (NEURONTIN) 100 mg capsule Take 1 Capsule by mouth nightly. Max Daily Amount: 100 mg. Indications: diabetic complication causing injury to some body nerves, neuropathic pain    Blood-Glucose Meter monitoring kit Use to check blood sugar three times weekly.  lancets misc Use to check blood sugar three times weekly.  glucose blood VI test strips (ASCENSIA AUTODISC VI, ONE TOUCH ULTRA TEST VI) strip Use to check blood sugar three times weekly and PRN.  carbamide peroxide (DEBROX) 6.5 % otic solution Administer 5 Drops into each ear two (2) times a day.  menthol-zinc oxide (CALMOSEPTINE) 0.44-20.6 % oint Apply 1 Each to affected area. Apply thin Layer to Sacral area as needed and after incontinence care   Indications: skin irritation    aspirin 81 mg chewable tablet Take 81 mg by mouth daily. Indications: stroke prevention     No current facility-administered medications for this visit. The Plan of Care has been reviewed during discussion at interdisciplinary group meeting  and reviewed and updated by the Interdisciplinary Group (IDG) as frequently as the patient condition warrants. Plan of Care by Discipline:    1.  Provider  Ongoing evaluation of treatment interventions for specific disease state, Assessment of medication adverse effects, Reduction of polypharmacy within benefit/burden framework appropriate to age, function and disease state, Determine need for laboratory assessment based on patient disease status , Assess results of laboratory testing and adjust treatment plan as appropriate and Assess for Home Health and order as medically indicated    2. Nursing  Coordination of care with specialty services, Review Health Maintenance with patient and provider; update as appropriate, Education regarding disease state, Education for skin care in patients with limited mobility; with focus on preventing skin breakdown and Provider caregiver / family support for patient's current and changing condition    3. Social Work  Assist in optimizing levels of psychosocial function, Assess patient for caregiver needs to optimize psychosocial function and safety, Provide information on available community resources and Assess current Advance Care Planning documents and make ACP recommendations as appropriate      Plan of Care Orders / Action Items:    Type 2 diabetes - HgA1C 6.9% down from 8.8% in February 2022. Continue metformin 1000mg BID. Gabapentin 100mg QHS controlling his neuropathic pain; continue. Will attempt to collect microalbumin at next visit. Information left in the home for in-home optometry services. Checking blood sugars approximately once weekly, which have been okay. Will obtain controlled substance agreement & urine toxicology screen at next visit. Acute DVT/On Chronic Anticoagulation - Currently taking eliquis 5mg BID for acute left leg DVT, diagnosed May 2022; no signs of side effects at this time. Has heme-onc appt in July to determine duration of anticoagulation. Alzheimer's Disease - Currently maintained on donepezil 10mg daily and namenda 10mg. No disturbing behaviors.  Discussed allowing him to do as much for himself as possible; will continue to monitor and consider comprehensive cognitive assessment in the future. Hypertension - Blood pressure under excellent control today on diltiazem ER 120mg daily and losartan 25mg daily. No changes. Hx of thyroid cancer/postablative hypothyroidism - TSH and FT4 at goal in February 2022; continue levothyroxine 112mcg daily. Recheck in 1 year. Hx of prostate CA - PSA checked February 2022 with PSA of 3.4. Discussed that this may be a benign finding as I have no other results to which to compare this number, but that urological referral/treatment may not align with goals of care. Referral to urology declined. Vitamin deficiencies - Previously on vit B12 and vit D supplementation from [facility]. Labs checked in February 2022; will discontinue B12 supplementation as this does not appear to be necessary but continue vit D supplementation to prevent deficiency, especially given fall risk. Debility/history of recent fall - Recently completed [facility] PT services. Discussed the importance of maintaining as much function as possible. Recent mechanical fall; encouraged using rollator any time he is ambulating. Chronic lumbar back pain - No known injury, has been present for years. Did receive exercises from PT recently for his back, which help when he completes them; also having relief with TENS unit, when it is used. Encouraged frequent movement/ambulation as well, as this appears to help. Using tylenol 2000mg daily. Advance Care Planning - POST form completed and uploaded to media.      Due for covid-19 booster; phone number left in the home today for daughter to schedule home Covid-19 booster.     Health Maintenance   Topic Date Due    MICROALBUMIN Q1  Never done    Eye Exam Retinal or Dilated  Never done    DTaP/Tdap/Td series (1 - Tdap) Never done    Shingrix Vaccine Age 50> (1 of 2) Never done    Pneumococcal 65+ years (2 - PCV) 12/14/2002    COVID-19 Vaccine (3 - Booster for Pfizer series) 07/17/2021    Flu Vaccine (Season Ended) 09/01/2022    Foot Exam Q1  02/18/2023    Lipid Screen  02/18/2023    Medicare Yearly Exam  02/19/2023    Depression Screen  04/15/2023         Estimated Visit Frequency:  Every 3 month Provider Visit  Last NP visit: 6/16/2022  SW visits PRN

## 2022-07-26 DIAGNOSIS — Z79.01 ON CONTINUOUS ORAL ANTICOAGULATION: ICD-10-CM

## 2022-07-26 DIAGNOSIS — Z85.46 HISTORY OF PROSTATE CANCER: ICD-10-CM

## 2022-07-26 DIAGNOSIS — I82.492 ACUTE DEEP VEIN THROMBOSIS (DVT) OF OTHER SPECIFIED VEIN OF LEFT LOWER EXTREMITY (HCC): ICD-10-CM

## 2022-07-26 DIAGNOSIS — E78.5 HYPERLIPIDEMIA, UNSPECIFIED HYPERLIPIDEMIA TYPE: ICD-10-CM

## 2022-07-26 RX ORDER — DILTIAZEM HYDROCHLORIDE 120 MG/1
CAPSULE, COATED, EXTENDED RELEASE ORAL
Qty: 30 CAPSULE | Refills: 1 | Status: SHIPPED | OUTPATIENT
Start: 2022-07-26 | End: 2022-09-29

## 2022-07-26 RX ORDER — MEMANTINE HYDROCHLORIDE 10 MG/1
TABLET ORAL
Qty: 60 TABLET | Refills: 1 | Status: SHIPPED | OUTPATIENT
Start: 2022-07-26 | End: 2022-09-29

## 2022-07-26 RX ORDER — APIXABAN 5 MG/1
TABLET, FILM COATED ORAL
Qty: 60 TABLET | Refills: 2 | Status: SHIPPED | OUTPATIENT
Start: 2022-07-26 | End: 2022-10-24 | Stop reason: SDUPTHER

## 2022-07-26 RX ORDER — LOSARTAN POTASSIUM 25 MG/1
TABLET ORAL
Qty: 30 TABLET | Refills: 1 | Status: SHIPPED | OUTPATIENT
Start: 2022-07-26 | End: 2022-09-29

## 2022-07-26 RX ORDER — METFORMIN HYDROCHLORIDE 1000 MG/1
TABLET ORAL
Qty: 60 TABLET | Refills: 1 | Status: SHIPPED | OUTPATIENT
Start: 2022-07-26 | End: 2022-09-29

## 2022-07-26 RX ORDER — TAMSULOSIN HYDROCHLORIDE 0.4 MG/1
CAPSULE ORAL
Qty: 30 CAPSULE | Refills: 1 | Status: SHIPPED | OUTPATIENT
Start: 2022-07-26 | End: 2022-09-29

## 2022-07-26 RX ORDER — LEVOTHYROXINE SODIUM 112 UG/1
TABLET ORAL
Qty: 30 TABLET | Refills: 1 | Status: SHIPPED | OUTPATIENT
Start: 2022-07-26 | End: 2022-09-29

## 2022-07-26 RX ORDER — ATORVASTATIN CALCIUM 10 MG/1
TABLET, FILM COATED ORAL
Qty: 30 TABLET | Refills: 1 | Status: SHIPPED | OUTPATIENT
Start: 2022-07-26 | End: 2022-09-29

## 2022-09-01 ENCOUNTER — DOCUMENTATION ONLY (OUTPATIENT)
Dept: FAMILY MEDICINE CLINIC | Age: 87
End: 2022-09-01

## 2022-09-01 NOTE — PROGRESS NOTES
Home Based Primary Care  Plan of Care    [facility] Team Members: Jack Browne MD; Jac Goldstein NP; Giovanni Saenz NP; Weston Roblero, RN; Gail Ratliff, RN; Gretel Bui RN; SUDHA Nunez  10/7/1931 / [id number]  male    The physician has reviewed and discussed the plan of care with the interdisciplinary group on 09/06/22 and agrees. 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 male who was referred to the [address] [place] [address] program due to taxing effort to seek primary care services in the community. Patient discussed today for 60 day POC review. Advance Care Planning:  Code status: DNR      Primary Decision Maker (Active): Elieser Dickey - [place] - [phone]   No flowsheet data found.     DME/Supplies:  Rolling walker     No Known Allergies    Nutritional Requirements:   Oral with supported meal preparation    Functional/Activity Level:  Ambulatory with assistance of cane, walker, or support of another person (significant impairment)    Safety Measures:   Fall risk and Self-care deficity    Acuity Level Rating: Low    Current Outpatient Medications   Medication Sig    gabapentin (NEURONTIN) 100 mg capsule TAKE 1 CAPSULE BY MOUTH NIGHTLY FOR DIABETIC COMPLICATION CAUSING INJURY TO SOME BODY NERVES, NEUROPATHIC PAIN    cholecalciferol (VITAMIN D3) (1000 Units /25 mcg) tablet TAKE 2 TABLETS BY MOUTH DAILY    acetaminophen (TYLENOL) 500 mg tablet TAKE 2 TABLETS BY MOUTH 2 TIMES A DAY. Eliquis 5 mg tablet TAKE 1 TABLET BY MOUTH TWO (2) TIMES A DAY. INDICATIONS: BLOOD CLOT IN A DEEP VEIN OF THE EXTREMITIES    dilTIAZem ER (CARDIZEM CD) 120 mg capsule TAKE 1 CAPSULE BY MOUTH DAILY    atorvastatin (LIPITOR) 10 mg tablet TAKE 1 TABLET BY MOUTH AT BEDTIME    metFORMIN (GLUCOPHAGE) 1,000 mg tablet TAKE 1 TABLET BY MOUTH TWICE A DAY    levothyroxine (SYNTHROID) 112 mcg tablet TAKE 1 TABLET BY MOUTH EVERY DAY    losartan (COZAAR) 25 mg tablet TAKE 1 TABLET BY MOUTH DAILY    tamsulosin (FLOMAX) 0.4 mg capsule TAKE 1 CAPSULE BY MOUTH AT BEDTIME    memantine (NAMENDA) 10 mg tablet TAKE 1 TABLET BY MOUTH TWICE A DAY    donepeziL (ARICEPT) 10 mg tablet Take 1 Tablet by mouth nightly. Blood-Glucose Meter monitoring kit Use to check blood sugar three times weekly. lancets misc Use to check blood sugar three times weekly. glucose blood VI test strips (ASCENSIA AUTODISC VI, ONE TOUCH ULTRA TEST VI) strip Use to check blood sugar three times weekly and PRN. carbamide peroxide (DEBROX) 6.5 % otic solution Administer 5 Drops into each ear two (2) times a day. menthol-zinc oxide (CALMOSEPTINE) 0.44-20.6 % oint Apply 1 Each to affected area. Apply thin Layer to Sacral area as needed and after incontinence care   Indications: skin irritation    aspirin 81 mg chewable tablet Take 81 mg by mouth daily. Indications: stroke prevention     No current facility-administered medications for this visit. The Plan of Care has been reviewed during discussion at interdisciplinary group meeting  and reviewed and updated by the Interdisciplinary Group (IDG) as frequently as the patient condition warrants. Plan of Care by Discipline:    1.  Provider  Ongoing evaluation of treatment interventions for specific disease state, Assessment of medication adverse effects, Reduction of polypharmacy within benefit/burden framework appropriate to age, function and disease state, Determine need for laboratory assessment based on patient disease status , Assess results of laboratory testing and adjust treatment plan as appropriate and Assess for Home Health and order as medically indicated    2. Nursing  Coordination of care with specialty services, Review Health Maintenance with patient and provider; update as appropriate, Education regarding disease state, Education for skin care in patients with limited mobility; with focus on preventing skin breakdown and Provider caregiver / family support for patient's current and changing condition    3. Social Work  Assist in optimizing levels of psychosocial function, Assess patient for caregiver needs to optimize psychosocial function and safety, Provide information on available community resources and Assess current Advance Care Planning documents and make ACP recommendations as appropriate      Plan of Care Orders / Action Items:    Type 2 diabetes - HgA1C 6.9% down from 8.8% in February 2022. Continue metformin 1000mg BID. Gabapentin 100mg QHS controlling his neuropathic pain; continue. Will attempt to collect microalbumin at next visit. Information left in the home for in-home optometry services. Checking blood sugars approximately once weekly, which have been okay. Will obtain controlled substance agreement & urine toxicology screen at next visit. Acute DVT/On Chronic Anticoagulation - Currently taking eliquis 5mg BID for acute left leg DVT, diagnosed May 2022; no signs of side effects at this time. Has heme-onc appt in July to determine duration of anticoagulation. Alzheimer's Disease - Currently maintained on donepezil 10mg daily and namenda 10mg. No disturbing behaviors. Discussed allowing him to do as much for himself as possible; will continue to monitor and consider comprehensive cognitive assessment in the future.   Hypertension - Blood pressure under excellent control on 6/16/22, on diltiazem ER 120mg daily and losartan 25mg daily. No changes. Hx of thyroid cancer/postablative hypothyroidism - TSH and FT4 at goal in February 2022; continue levothyroxine 112mcg daily. Recheck in 1 year. Hx of prostate CA - PSA checked February 2022 with PSA of 3.4. Discussed that this may be a benign finding as I have no other results to which to compare this number, but that urological referral/treatment may not align with goals of care. Referral to urology declined. Vitamin deficiencies - Previously on vit B12 and vit D supplementation from [facility]. Labs checked in February 2022; will discontinue B12 supplementation as this does not appear to be necessary but continue vit D supplementation to prevent deficiency, especially given fall risk. Debility/history of recent fall - Recently completed [facility] PT services. Discussed the importance of maintaining as much function as possible. Recent mechanical fall; encouraged using rollator any time he is ambulating. Chronic lumbar back pain - No known injury, has been present for years. Did receive exercises from PT recently for his back, which help when he completes them; also having relief with TENS unit, when it is used. Encouraged frequent movement/ambulation as well, as this appears to help. Using tylenol 2000mg daily. Advance Care Planning - POST form completed and uploaded to media. Due for covid-19 booster; phone number left in the home on 6/16/22 for daughter to schedule home Covid-19 booster.     Health Maintenance   Topic Date Due    MICROALBUMIN Q1  Never done    Eye Exam Retinal or Dilated  Never done    DTaP/Tdap/Td series (1 - Tdap) Never done    Shingrix Vaccine Age 50> (1 of 2) Never done    Pneumococcal 65+ years (2 - PCV) 12/14/2002    COVID-19 Vaccine (3 - Booster for Pfizer series) 07/17/2021    Flu Vaccine (1) Never done    Foot Exam Q1  02/18/2023    Lipid Screen  02/18/2023    Medicare Yearly Exam  02/19/2023 Depression Screen  04/15/2023         Estimated Visit Frequency:  Every 3 month Provider Visit  Last NP visit: 6/16/2022  SW visits PRN

## 2022-09-01 NOTE — PROGRESS NOTES
[facility] at [address] [facility]  Dariel Sender: [phone]  F: [phone]      Reason for Visit:   Roger Crocker is a 80 y.o. male who is seen in consultation at the request of Paulette Olguin for evaluation of VTE. History of Present Illness:   Roger Crocker is a pleasant 80 y.o. male who presents today for evaluation of VTE. He was seen by Gatito Evangelista on 5/4/2022 with several days of a swollen ankle. A doppler was ordered and positive for left LE DVT. He was sent to the ED the same day and discharged from the ED on apixaban. He followed up with Ms. Leal a few days later, who in turn referred him to see me. His leg swelling improved within a few weeks, no residual swelling now. He remains on apixaban twice a day. He has not had any significant bleeding. His mobility is poor, spends most of his day in a chair. Walks with a walker. He has had two falls this year. No obvious risk factors prior to his VTE. No surgeries, hospitalizations, trauma, immobilization, hormone therapy, or long travel. He has a history of Alzheimer's disease diagnosed around 2014. He lives at home with his daughter, who accompanied him on the video visit today and did almost all of the talking during today's visit. He has a history of prostate cancer in the 1990's, treated with radiation. He has not been following with urology. His PSA was checked with his PCP in 2/2022 and was 3.4. Unclear what this was previously. Review of systems was obtained and pertinent findings reviewed above. Past medical history, social history, family history, medications, and allergies are located in the electronic medical record. Physical Exam:   There were no vitals taken for this visit.   General: alert, cooperative, no distress   Mental  status: normal mood, behavior, speech, dress, motor activity, and thought processes, able to follow commands   HENT: NCAT   Neck: no visualized mass   Resp: no respiratory distress   Neuro: no gross deficits   Skin: no discoloration or lesions of concern on visible areas   Psychiatric: normal affect, consistent with stated mood, no evidence of hallucinations       Due to this being a TeleHealth evaluation ([facility]), many elements of the physical examination are unable to be assessed. Evaluation of the following organ systems was limited: Vitals/Constitutional/EENT/Resp/CV/GI//MS/Neuro/Skin/Heme-Lymph-Imm. Results:     Lab Results   Component Value Date/Time    WBC 7.6 06/16/2022 01:46 PM    HGB 12.7 06/16/2022 01:46 PM    HCT 40.5 06/16/2022 01:46 PM    PLATELET 975 65/94/9891 01:46 PM    .5 (H) 06/16/2022 01:46 PM    ABS. NEUTROPHILS 3.4 05/04/2022 04:32 PM     Lab Results   Component Value Date/Time    Sodium 137 05/04/2022 04:32 PM    Potassium 4.2 05/04/2022 04:32 PM    Chloride 104 05/04/2022 04:32 PM    CO2 25 05/04/2022 04:32 PM    Glucose 116 (H) 05/04/2022 04:32 PM    BUN 18 05/04/2022 04:32 PM    Creatinine 1.06 05/04/2022 04:32 PM    GFR est AA >60 05/04/2022 04:32 PM    GFR est non-AA >60 05/04/2022 04:32 PM    Calcium 9.0 05/04/2022 04:32 PM     Lab Results   Component Value Date/Time    Bilirubin, total 0.5 05/04/2022 04:32 PM    ALT (SGPT) 31 05/04/2022 04:32 PM    Alk. phosphatase 49 05/04/2022 04:32 PM    Protein, total 7.4 05/04/2022 04:32 PM    Albumin 3.3 (L) 05/04/2022 04:32 PM    Globulin 4.1 (H) 05/04/2022 04:32 PM     Prostate Specific Ag (ng/mL)   Date Value   02/18/2022 3.4         Bilateral LE Doppler 5/4/2022:  Consistent with mainly occlusive thrombus involving the left common femoral, deep femoral and femoral veins. The ultrasound appearance suggests a more acute than chronic process. Superficial thrombus also observed in the left greater saphenous from groin to lower calf.   The left peroneal was not clearly seen due to swelling/pain tolerance and isolated thrombus cannot be completely excluded. Records from PCP and ED reviewed and summarized above. Test results above have been reviewed. Assessment:   1) Left LE DVT, proximal  Diagnosed 5/4/2022. Risk factors at that time include reduced mobility, possible prostate cancer recurrence. Treated with apixaban, which he remains on currently. As his event appears unprovoked, I recommend remaining on anticoagulation long-term. This decision can be readdressed should his dementia worsen and goals of care change, or if his falls become more frequent, or if he develops bleeding issues. I agree with apixaban as currently dosed. After 6 months on therapy, if he is not found to have recurrent prostate cancer, then it would be reasonable to reduce the dose to 2.5mg PO BID and remain on this long term. I do not recommend thrombophilia testing at this time, as the results are not likely to . The family desires to limit the number of physicians that he is seeing. I will send my recommendations back to his PCP and I will remain on standby if needed. 2) Alzheimer's Dementia  Following with his PCP. Daughter is POA. Family is realistic in regards to goals of care and does not desire to be very aggressive in his medical management. 3) H/o prostate cancer  Diagnosed in the 1990's by daughter's report. Treated with definitive radiation. I am concerned about his PSA of 3.4 earlier this year, this suggests possible recurrence of his prostate cancer. I recommend repeating his PSA now to establish the trend. If rising, then we could consider staging with CT and bone scan and considering therapy with ADT (ie Lupron), if this is fitting with goals of care. Alternatively, supportive care alone may be appropriate. They wish to follow up with his PCP to discuss this, and I will remain on standby if needed.       Plan:     Continue anticoagulation long term  Continue apixaban 5mg PO BID to complete 6 months of therapy, then reduce to 2.5mg PO BID (if prostate cancer is found, I recommend remaining on 5mg PO BID long-term)  Follow up with PCP for PSA check  Return to see me as needed    Signed By: José Luis Serra MD      The patient was evaluated through a synchronous (real-time) audio-video encounter. The patient (or guardian if applicable) is aware that this is a billable service, which includes applicable co-pays. This Virtual Visit was conducted with patient's (and/or legal guardian's) consent. The visit was conducted pursuant to the emergency declaration under the [facility], [address] [place] [address] wa[facility] authority and the [employer] and [employer] Act. Patient identification was verified, and a caregiver was present when appropriate. The patient was located in a state where the provider was licensed to provide care.

## 2022-09-07 ENCOUNTER — VIRTUAL VISIT (OUTPATIENT)
Dept: ONCOLOGY | Age: 87
End: 2022-09-07
Payer: MEDICARE

## 2022-09-07 DIAGNOSIS — I82.492 ACUTE DEEP VEIN THROMBOSIS (DVT) OF OTHER SPECIFIED VEIN OF LEFT LOWER EXTREMITY (HCC): Primary | ICD-10-CM

## 2022-09-07 DIAGNOSIS — Z85.46 HISTORY OF PROSTATE CANCER: ICD-10-CM

## 2022-09-07 PROCEDURE — G8432 DEP SCR NOT DOC, RNG: HCPCS | Performed by: INTERNAL MEDICINE

## 2022-09-07 PROCEDURE — 1123F ACP DISCUSS/DSCN MKR DOCD: CPT | Performed by: INTERNAL MEDICINE

## 2022-09-07 PROCEDURE — G8536 NO DOC ELDER MAL SCRN: HCPCS | Performed by: INTERNAL MEDICINE

## 2022-09-07 PROCEDURE — 99204 OFFICE O/P NEW MOD 45 MIN: CPT | Performed by: INTERNAL MEDICINE

## 2022-09-07 PROCEDURE — 1101F PT FALLS ASSESS-DOCD LE1/YR: CPT | Performed by: INTERNAL MEDICINE

## 2022-09-07 PROCEDURE — G8421 BMI NOT CALCULATED: HCPCS | Performed by: INTERNAL MEDICINE

## 2022-09-07 PROCEDURE — G8427 DOCREV CUR MEDS BY ELIG CLIN: HCPCS | Performed by: INTERNAL MEDICINE

## 2022-09-29 DIAGNOSIS — E78.5 HYPERLIPIDEMIA, UNSPECIFIED HYPERLIPIDEMIA TYPE: ICD-10-CM

## 2022-09-29 DIAGNOSIS — Z85.46 HISTORY OF PROSTATE CANCER: ICD-10-CM

## 2022-09-29 DIAGNOSIS — G30.9 ALZHEIMER'S DISEASE (HCC): ICD-10-CM

## 2022-09-29 DIAGNOSIS — F02.80 ALZHEIMER'S DISEASE (HCC): ICD-10-CM

## 2022-09-29 RX ORDER — LOSARTAN POTASSIUM 25 MG/1
TABLET ORAL
Qty: 30 TABLET | Refills: 1 | Status: SHIPPED | OUTPATIENT
Start: 2022-09-29

## 2022-09-29 RX ORDER — DONEPEZIL HYDROCHLORIDE 10 MG/1
10 TABLET, FILM COATED ORAL
Qty: 30 TABLET | Refills: 5 | Status: SHIPPED | OUTPATIENT
Start: 2022-09-29

## 2022-09-29 RX ORDER — ATORVASTATIN CALCIUM 10 MG/1
TABLET, FILM COATED ORAL
Qty: 30 TABLET | Refills: 1 | Status: SHIPPED | OUTPATIENT
Start: 2022-09-29

## 2022-09-29 RX ORDER — TAMSULOSIN HYDROCHLORIDE 0.4 MG/1
CAPSULE ORAL
Qty: 30 CAPSULE | Refills: 1 | Status: SHIPPED | OUTPATIENT
Start: 2022-09-29

## 2022-09-29 RX ORDER — DILTIAZEM HYDROCHLORIDE 120 MG/1
CAPSULE, COATED, EXTENDED RELEASE ORAL
Qty: 30 CAPSULE | Refills: 1 | Status: SHIPPED | OUTPATIENT
Start: 2022-09-29

## 2022-09-29 RX ORDER — METFORMIN HYDROCHLORIDE 1000 MG/1
TABLET ORAL
Qty: 60 TABLET | Refills: 1 | Status: SHIPPED | OUTPATIENT
Start: 2022-09-29

## 2022-09-29 RX ORDER — MEMANTINE HYDROCHLORIDE 10 MG/1
TABLET ORAL
Qty: 60 TABLET | Refills: 1 | Status: SHIPPED | OUTPATIENT
Start: 2022-09-29

## 2022-09-29 RX ORDER — LEVOTHYROXINE SODIUM 112 UG/1
TABLET ORAL
Qty: 30 TABLET | Refills: 1 | Status: SHIPPED | OUTPATIENT
Start: 2022-09-29

## 2022-10-03 ENCOUNTER — HOME VISIT (OUTPATIENT)
Dept: FAMILY MEDICINE CLINIC | Age: 87
End: 2022-10-03
Payer: MEDICARE

## 2022-10-03 VITALS
SYSTOLIC BLOOD PRESSURE: 142 MMHG | HEART RATE: 61 BPM | TEMPERATURE: 98.5 F | OXYGEN SATURATION: 100 % | DIASTOLIC BLOOD PRESSURE: 78 MMHG | RESPIRATION RATE: 20 BRPM

## 2022-10-03 DIAGNOSIS — I82.492 ACUTE DEEP VEIN THROMBOSIS (DVT) OF OTHER SPECIFIED VEIN OF LEFT LOWER EXTREMITY (HCC): ICD-10-CM

## 2022-10-03 DIAGNOSIS — E89.0 POSTABLATIVE HYPOTHYROIDISM: ICD-10-CM

## 2022-10-03 DIAGNOSIS — I10 PRIMARY HYPERTENSION: ICD-10-CM

## 2022-10-03 DIAGNOSIS — R97.20 ELEVATED PSA, LESS THAN 10 NG/ML: ICD-10-CM

## 2022-10-03 DIAGNOSIS — F02.80 ALZHEIMER'S DISEASE (HCC): ICD-10-CM

## 2022-10-03 DIAGNOSIS — E11.65 TYPE 2 DIABETES MELLITUS WITH HYPERGLYCEMIA, WITHOUT LONG-TERM CURRENT USE OF INSULIN (HCC): Primary | ICD-10-CM

## 2022-10-03 DIAGNOSIS — Z79.01 ON CONTINUOUS ORAL ANTICOAGULATION: ICD-10-CM

## 2022-10-03 DIAGNOSIS — Z85.46 HISTORY OF PROSTATE CANCER: ICD-10-CM

## 2022-10-03 DIAGNOSIS — G30.9 ALZHEIMER'S DISEASE (HCC): ICD-10-CM

## 2022-10-03 DIAGNOSIS — E11.65 TYPE 2 DIABETES MELLITUS WITH HYPERGLYCEMIA, WITHOUT LONG-TERM CURRENT USE OF INSULIN (HCC): ICD-10-CM

## 2022-10-03 LAB
ANION GAP SERPL CALC-SCNC: 6 MMOL/L (ref 5–15)
BUN SERPL-MCNC: 21 MG/DL (ref 6–20)
BUN/CREAT SERPL: 19 (ref 12–20)
CALCIUM SERPL-MCNC: 8.6 MG/DL (ref 8.5–10.1)
CHLORIDE SERPL-SCNC: 105 MMOL/L (ref 97–108)
CO2 SERPL-SCNC: 24 MMOL/L (ref 21–32)
CREAT SERPL-MCNC: 1.08 MG/DL (ref 0.7–1.3)
ERYTHROCYTE [DISTWIDTH] IN BLOOD BY AUTOMATED COUNT: 12.9 % (ref 11.5–14.5)
EST. AVERAGE GLUCOSE BLD GHB EST-MCNC: 160 MG/DL
GLUCOSE SERPL-MCNC: 267 MG/DL (ref 65–100)
HBA1C MFR BLD: 7.2 % (ref 4–5.6)
HCT VFR BLD AUTO: 36 % (ref 36.6–50.3)
HGB BLD-MCNC: 11.7 G/DL (ref 12.1–17)
MCH RBC QN AUTO: 31.7 PG (ref 26–34)
MCHC RBC AUTO-ENTMCNC: 32.5 G/DL (ref 30–36.5)
MCV RBC AUTO: 97.6 FL (ref 80–99)
NRBC # BLD: 0 K/UL (ref 0–0.01)
NRBC BLD-RTO: 0 PER 100 WBC
PLATELET # BLD AUTO: 258 K/UL (ref 150–400)
PMV BLD AUTO: 9.5 FL (ref 8.9–12.9)
POTASSIUM SERPL-SCNC: 4.4 MMOL/L (ref 3.5–5.1)
PSA SERPL-MCNC: 2 NG/ML (ref 0.01–4)
RBC # BLD AUTO: 3.69 M/UL (ref 4.1–5.7)
SODIUM SERPL-SCNC: 135 MMOL/L (ref 136–145)
WBC # BLD AUTO: 7.1 K/UL (ref 4.1–11.1)

## 2022-10-03 PROCEDURE — 1123F ACP DISCUSS/DSCN MKR DOCD: CPT | Performed by: NURSE PRACTITIONER

## 2022-10-03 PROCEDURE — 3051F HG A1C>EQUAL 7.0%<8.0%: CPT | Performed by: NURSE PRACTITIONER

## 2022-10-03 PROCEDURE — 36415 COLL VENOUS BLD VENIPUNCTURE: CPT | Performed by: NURSE PRACTITIONER

## 2022-10-03 PROCEDURE — 1101F PT FALLS ASSESS-DOCD LE1/YR: CPT | Performed by: NURSE PRACTITIONER

## 2022-10-03 PROCEDURE — G8427 DOCREV CUR MEDS BY ELIG CLIN: HCPCS | Performed by: NURSE PRACTITIONER

## 2022-10-03 PROCEDURE — 99350 HOME/RES VST EST HIGH MDM 60: CPT | Performed by: NURSE PRACTITIONER

## 2022-10-03 PROCEDURE — G8536 NO DOC ELDER MAL SCRN: HCPCS | Performed by: NURSE PRACTITIONER

## 2022-10-03 NOTE — PROGRESS NOTES
Home Based Primary Care [facility]) & Supportive Services    [phone]      NOTE: Home Based Primary Care is a PROVIDER (MD/NP) based interdisciplinary practice (RN, LCSW) for patients who cannot access (or have a taxing effort) primary / speciality medical care in an office setting. \[facility]\"" is NOT [employer] but works with [address] when there is a skilled need. Our MD/NPs are integral in Care Transitions; PLEASE CALL [phone] if this patient arrives in the Emergency Department or Hospital.        Date of Current Visit: 10/03/22  Patient/Family present for Current Visit: patient, daughter Susi Alexander, caregiver Carmen  Goals of Care as stated by the patient/family is: to remain at home, limit interventions     Preferences for hospitalization if that were to be necessary:  [] Patient DOES NOT WANT hospitalization; focus all treatments at home  [x] Patient WOULD WANT hospitalization for potentially reversible causes  Patient prefers hospitalization at: [facility]    Per daughter, Susi Alexander, she and her brother (who lives in [place] and is a PCP) share POA for their dad. Per Susi Alexander, he wishes to only go to the hospital \"if I'm bleeding or have a broken bone. \"       Georgette Guaman (: 10/7/1931) is a 80 y.o. male, established patient, here for evaluation of the following chief complaint(s):  Follow Up Chronic Condition       ASSESSMENT/PLAN:  Below is the assessment and plan developed based on review of pertinent history, physical exam, labs, studies, and medications. 1. Type 2 diabetes mellitus with hyperglycemia, without long-term current use of insulin (HCC)  -     MICROALBUMIN, UR, RAND W/ MICROALB/CREAT RATIO; Future  -     HEMOGLOBIN A1C WITH EAG; Future  2. Acute deep vein thrombosis (DVT) of other specified vein of left lower extremity (HCC)  -     CBC W/O DIFF; Future  -     METABOLIC PANEL, BASIC; Future  3. On continuous oral anticoagulation  4.  Primary hypertension  -     CBC W/O DIFF; Future  -     METABOLIC PANEL, BASIC; Future  5. Postablative hypothyroidism  6. Alzheimer's disease ([facility] [address]  7. History of prostate cancer  8. Elevated PSA, less than 10 ng/ml  -     PSA, DIAGNOSTIC (PROSTATE SPECIFIC AG); Future         Type 2 diabetes - HgA1C 6.9% in June 2022, down from 8.8% in February 2022. Rechecked today and is fine at 7.2%. Continue metformin 1000mg BID. Gabapentin 100mg QHS controlling his neuropathic pain; continue. Will attempt to collect microalbumin at next visit; unable to void today. Checking blood sugars approximately once weekly, which have been within normal limits. Will obtain controlled substance agreement & urine toxicology screen at next visit. Acute DVT/On Chronic Anticoagulation - Currently taking eliquis 5mg BID for acute left leg DVT, diagnosed May 2022; no signs of side effects at this time. Recently saw Dr. 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 PSA recheck today is 2.0; discussed with daughter by phone plan to reduce eliquis to 2.5mg BID at next visit in accordance with heme-onc's recommendations. Daughter reaffirmed desire today not to pursue cancer workup/treatment in alignment with goals of care. Alzheimer's Disease - Currently maintained on donepezil 10mg daily and namenda 10mg. No disturbing behaviors. Discussed allowing him to do as much for himself as possible; will continue to monitor and consider comprehensive cognitive assessment in the future. Hypertension - Blood pressure under fair control today on diltiazem ER 120mg daily and losartan 25mg daily. No changes today. Hx of thyroid cancer/postablative hypothyroidism - TSH and FT4 at goal in February 2022; continue levothyroxine 112mcg daily. Recheck in 1 year. Hx of prostate CA - PSA checked February 2022 with PSA of 3.4. Discussed that this may be a benign finding as I have no other results to which to compare this number, but that urological referral/treatment may not align with goals of care. Referral to urology declined. Recheck today per heme-onc's recommendation to determine recommended dose of eliquis; PSA now decreased to 2.0 in October 2022. Vitamin deficiencies - Previously on vit B12 and vit D supplementation from [facility]. Labs checked in February 2022; will discontinue B12 supplementation as this does not appear to be necessary but continue vit D supplementation to prevent deficiency, especially given fall risk. Debility/history of recent fall - Discussed the importance of maintaining as much function as possible. Recent mechanical fall; encouraged using rollator any time he is ambulating. Chronic lumbar back pain - No known injury, has been present for years. Did receive exercises from PT recently for his back, which help when he completes them; also having relief with TENS unit, when it is used. Encouraged frequent movement/ambulation as well, as this appears to help. Using tylenol 2000mg daily. Daughter Adore Lopez reports that pain has been well-managed on acetaminophen. Advance Care Planning - POST form completed at previous visit and uploaded to media. Preventative Care - Due for flu and covid-19 vaccines; daughter Adore Lopez elects to bring patient to local pharmacy to receive these immunizations. Will follow up at next visit also on diabetic eye exam.     Has appt later this month with [facility] for hearing evaluation/hearing aid evaluation. Large quantity of cerumen removed today from bilateral ears by this provider using curettage; encouraged nightly use of debrox until ENT appointment to prevent further wax build up. Labs drawn today; will call daughter with results & recommendations. Follow up in 3 months for routine care, sooner PRN.                   Health Maintenance Due   Topic Date Due COVID-19 Vaccine (1) Never done    DTaP/Tdap/Td series (1 - Tdap) Never done    Shingrix Vaccine Age 50> (1 of 2) Never done    Pneumococcal 65+ years (1 of 1 - PPSV23) Never done    Flu Vaccine (1) Never done              Return in 3 months (on 2023) for 3 month follow up. SUBJECTIVE/OBJECTIVE:      The patient is seen today in his home due to taxing effort to seek primary care services in the community due to impaired mobility and advanced dementia. His daughter Tawnya Millard provides most of the history today, and reports that he has been doing well since last visit. Mr. Iveth Marin is a retired [employer] , who moved to [place] in 2022 to live with his daughter, Tawnya Millard. He was previously living in an assisted living facility in [place] for approximately 4 years, after his wife . He was unable to care for himself so moved to the assisted living facility, and was moved to [place] to be closer to his daughter and her family. He has 4 hours of caregiving each day through his VA benefits, which his daughter Tawnya Millard reports today is plenty. He ambulates around the home with a rolling walker, and uses a wheelchair if he goes out, which is usually about every 2 weeks to go to a restaurant. He has had 2 falls in the past year, but none with injury. He did recently \"slide\" down to the floor after his bed moved while he was gripping it; there were no injuries. A typical day includes him waking up around 8:30am, showering with help from his aide, and eating breakfast. He'll sit in the living room and watch television until lunch, then nap for about an hour. He eats dinner around 6:30pm and goes to bed around 8/9pm, sleeping well through the night. He was diagnosed with acute DVT in early May 2022 after developing acute left leg swelling and pain. He was seen by [facility] and sent for an ultrasound, which confirmed DVT.  He was started on eliquis by the ER and has had improvement in symptoms since that time, with very minimal swelling. He saw Dr. 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 He did recommend rechecking PSA due to history of prostate cancer and PSA of 3.4 in early 2022; if not rising further, could consider decreasing dose of eliquis to 2.5mg BID after 6 months. Serafin Al expresses desire today to recheck PSA to determine optimal dose of eliquis but reaffirms her desire not to pursue prostate cancer workup, should this be rising further. He has a history of hypertension, currently taking diltiazem ER 120mg and losartan 25mg daily. His daughter denies history of MI or stroke. Home blood pressures are not routinely checked. He has a history of prostate cancer, diagnosed in approximately 2002, for which he did radiation therapy. PSA was elevated at 3.4 in February 2022; at this point, evaluation is being deferred. However, asking for recheck today per recommendations from heme-onc. He has a history of thyroid cancer, diagnosed in approximately 1998. He had thyroid ablation and currently taking levothyroxine 112mcg daily. TFTs were at goal in February 2022. He has type 2 diabetes with diabetic neuropathy. He takes metformin 1000mg BID, increased in January 2022 while at the assisted living facility, and takes gabapentin 100mg QHS for neuropathic pain, which is effective. Blood sugars are not routinely checked, though they do now have a glucometer and testing supplies. He has not had an eye exam in approximately 4 years; information was left in the home to schedule in-home optometry exam at previous visit. Most recent HgA1C was 6.9% in June 2022. He also has Alzheimer's disease, diagnosed in approximately 2014. He takes donepezil 10mg nightly along with namenda 10mg.  His daughter reports that he does not have any behavior disturbances and sleeps well, describing his symptoms as \"pleasantly confused. \"    He is incontinent of urine and stool, but is able to change his brief with some assistance and prompting. He is able to feed himself when food is prepared for him and has a hearty appetite. He sleeps well. His daughter and son share POA (both medical and financial); this documentation has been uploaded. He has now completed PT for his gait instability and low back pain. He has some relief from the pain when doing the exercises and using the TENS unit, though it can be somewhat inconvenient to hook him up so he doesn't do it often. He has not had any falls since last visit and continues to walk with the rollator. Dee Monge reports that he is no longer complaining of low back pain and takes tylenol twice daily for pain relief.      REVIEW OF SYSTEMS:     ROS  Constitutional: denies activity change, appetite change, fatigue, fever  HEENT: denies congestion, sinus pressure, sore throat; endorses hearing loss  CV: denies chest pain, palpitations, edema  Respiratory: denies shortness of breath, wheezing  GI: denies abdominal pain, blood in stool, diarrhea, constipation  MSK: denies arthralgias, joint swelling  Neuro: denies frequent headaches, dizziness, numbness/tingling  Skin: denies skin breakdown  Psych: denies depression, anxiety, confusion, endorses chronic memory changes         FUNCTIONAL ASSESSMENT:     Palliative Performance Scale (PPS): 50     PSYCHOSOCIAL/SPIRITUAL SCREENING:      Any spiritual / Holiness concerns: [] Yes /  [x] No     Caregiver Burnout: [] Yes /  [x] No /  [] No Caregiver Present       PHYSICAL EXAM:       Visit Vitals  BP (!) 106/58 (BP 1 Location: Left upper arm, BP Patient Position: Sitting, BP Cuff Size: Adult)   Pulse 73   Temp (!) 96.7 °F (35.9 °C) (Temporal)   Resp 20   Wt 175 lb 1.6 oz (79.4 kg)   SpO2 96%       Constitutional: no acute distress, pleasant, well-groomed  male; overweight; ambulates with rollator; [facility]  HEENT: normocephalic, atraumatic, external ears normal bilaterally; moist mucous membranes; conjugate gaze; + impacted cerumen bilaterally, very hard-of-hearing  CV: regular rate and rhythm, no murmurs noted; no dependent swelling  Pulm: normal effort, normal breath sounds without wheezing or rhonchi  Abd: normal bowel sounds, soft, non-tender  : deferred  Skin: warm, dry; no skin breakdown noted  Neuro: A & O x 2; mental status at baseline; remote memory intact but otherwise pleasantly confused  Psych: mood and behavior normal           Current Outpatient Medications on File Prior to Visit   Medication Sig Dispense Refill    dilTIAZem ER (CARDIZEM CD) 120 mg capsule TAKE 1 CAPSULE BY MOUTH EVERY DAY 30 Capsule 1    atorvastatin (LIPITOR) 10 mg tablet TAKE 1 TABLET BY MOUTH EVERYDAY AT BEDTIME 30 Tablet 1    metFORMIN (GLUCOPHAGE) 1,000 mg tablet TAKE 1 TABLET BY MOUTH TWICE A DAY 60 Tablet 1    losartan (COZAAR) 25 mg tablet TAKE 1 TABLET BY MOUTH EVERY DAY 30 Tablet 1    tamsulosin (FLOMAX) 0.4 mg capsule TAKE 1 CAPSULE BY MOUTH EVERYDAY AT BEDTIME 30 Capsule 1    memantine (NAMENDA) 10 mg tablet TAKE 1 TABLET BY MOUTH TWICE A DAY 60 Tablet 1    levothyroxine (SYNTHROID) 112 mcg tablet TAKE 1 TABLET BY MOUTH EVERY DAY 30 Tablet 1    donepeziL (ARICEPT) 10 mg tablet TAKE 1 TABLET BY MOUTH NIGHTLY 30 Tablet 5    gabapentin (NEURONTIN) 100 mg capsule TAKE 1 CAPSULE BY MOUTH NIGHTLY FOR DIABETIC COMPLICATION CAUSING INJURY TO SOME BODY NERVES, NEUROPATHIC PAIN 30 Capsule 5    cholecalciferol (VITAMIN D3) (1000 Units /25 mcg) tablet TAKE 2 TABLETS BY MOUTH DAILY 60 Tablet 5    acetaminophen (TYLENOL) 500 mg tablet TAKE 2 TABLETS BY MOUTH 2 TIMES A DAY. 120 Tablet 5    Eliquis 5 mg tablet TAKE 1 TABLET BY MOUTH TWO (2) TIMES A DAY.  INDICATIONS: BLOOD CLOT IN A DEEP VEIN OF THE EXTREMITIES 60 Tablet 2    Blood-Glucose Meter monitoring kit Use to check blood sugar three times weekly. 1 Kit 0    lancets misc Use to check blood sugar three times weekly. 1 Each 11    glucose blood VI test strips (ASCENSIA AUTODISC VI, ONE TOUCH ULTRA TEST VI) strip Use to check blood sugar three times weekly and PRN. 30 Strip 11    carbamide peroxide (DEBROX) 6.5 % otic solution Administer 5 Drops into each ear two (2) times a day. 30 mL 0    menthol-zinc oxide (CALMOSEPTINE) 0.44-20.6 % oint Apply 1 Each to affected area. Apply thin Layer to Sacral area as needed and after incontinence care   Indications: skin irritation       No current facility-administered medications on file prior to visit. Advanced Care Planning    Primary Decision Maker (Active): Paradise Arriaga - Daughter - [phone]    Code Status: DNR  Advanced Medical Directive: POST form completed June 2022      On this date 10/03/2022 I have spent 60 minutes reviewing previous notes, test results, drawing blood and face to face with the patient and his daughter discussing the diagnosis and importance of compliance with the treatment plan as well as documenting on the day of the visit. An electronic signature was used to authenticate this note.   -- Aidee Villela NP

## 2022-10-20 ENCOUNTER — TELEPHONE (OUTPATIENT)
Dept: ONCOLOGY | Age: 87
End: 2022-10-20

## 2022-10-20 NOTE — TELEPHONE ENCOUNTER
Patients daughter called and stated that the patient has gone into medicare prescription coverage gap and his eliquis medication will cost him over $100 a month, so she was wondering if there was another less expensive option for his medication.      # [phone]

## 2022-10-21 NOTE — TELEPHONE ENCOUNTER
10/21/22- Called patients daughterBrad and left a detailed vm about the assistance options for Eliquis requesting a return call to discuss further.

## 2022-10-24 ENCOUNTER — TELEPHONE (OUTPATIENT)
Dept: FAMILY MEDICINE CLINIC | Age: 87
End: 2022-10-24

## 2022-10-24 DIAGNOSIS — I82.492 ACUTE DEEP VEIN THROMBOSIS (DVT) OF OTHER SPECIFIED VEIN OF LEFT LOWER EXTREMITY (HCC): ICD-10-CM

## 2022-10-24 DIAGNOSIS — Z79.01 ON CONTINUOUS ORAL ANTICOAGULATION: ICD-10-CM

## 2022-10-24 NOTE — TELEPHONE ENCOUNTER
Noted. Would still recommend higher dose of 5mg BID for the full 6 months. Rx sent for lower dose of 2.5mg BID to be started 11/4/22.   Antoinette Lauren, NP

## 2022-10-24 NOTE — TELEPHONE ENCOUNTER
[address] at [place]  [phone]    10/24/22- Returned pt phone call she reported her father is in \" the insurance donut hole and is paying high co-pay for medication\". She is inquiring if he still needs eliquis or if he can reduce dose. Advised her per  at pt last visit they discussed PCP managing medication. Informed her of the following per  below. She will reach out to PCP. NO further questions or concerns. Recommend reducing to 2.5mg after 6 months on therapy, which is coming up soon. But they would need to discuss with his PCP who is prescribing this. Her note indicated plans to make this change at their next visit.

## 2022-10-24 NOTE — TELEPHONE ENCOUNTER
10/22/22- Patients daughter, Penny Stephanie returned this users call. Per Penny Brown at patients last visit it was discussed patient could take a lower dosage of Eliquis or stop it all together. Penny Brown is wondering if this possible and would like a return call to discuss further. Call back number [phone] for Penny Brown.

## 2022-10-24 NOTE — TELEPHONE ENCOUNTER
Call returned to patient's daughter, Cherie Sapp to inform her that I have discussed the dose reduction request with Milady Prater NP and reviewed Dr. Estela Gooden note. Milady Prater NP recommends that patient completes the 6 months of Eliquis 5mg (11/4), then she is ok with reducing the dose to 2.5mg.   Cherie Sapp responded that the 5mg medication on hand will only last until the end of the month (10/31). She plans to start the new prescription of 2.5mg at that time. I responded that I would notify NP about this.

## 2022-11-02 ENCOUNTER — DOCUMENTATION ONLY (OUTPATIENT)
Dept: FAMILY MEDICINE CLINIC | Age: 87
End: 2022-11-02

## 2022-11-02 NOTE — PROGRESS NOTES
Home Based Primary Care  Plan of Care    [facility] Team Members: Dilia Thomas MD; Nvea Pandey NP; Sergio Laura, MINDA; Abimbola Michaels, RN; Shiloh Bah, RN; Gretel Bui, HUONG; SUDHA Wheatley  10/7/1931 / [id number]  male    The physician has reviewed and discussed the plan of care with the interdisciplinary group on 11/01/2022 and agrees. 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 male who was referred to the [address] [place] [address] program due to taxing effort to seek primary care services in the community. Patient discussed today for 60 day POC review. Advance Care Planning:  Code status: DNR      Primary Decision Maker (Active): Ana Rosa Leong - [phone]   No flowsheet data found. DME/Supplies:  Rolling walker     No Known Allergies    Nutritional Requirements:   Oral with supported meal preparation    Functional/Activity Level:  Ambulatory with assistance of cane, walker, or support of another person (significant impairment)    Safety Measures:   Fall risk and Self-care deficity    Acuity Level Rating: Low    Current Outpatient Medications   Medication Sig    apixaban (Eliquis) 2.5 mg tablet TAKE 1 TABLET BY MOUTH TWO (2) TIMES A DAY.  INDICATIONS: BLOOD CLOT IN A DEEP VEIN OF THE EXTREMITIES    dilTIAZem ER (CARDIZEM CD) 120 mg capsule TAKE 1 CAPSULE BY MOUTH EVERY DAY atorvastatin (LIPITOR) 10 mg tablet TAKE 1 TABLET BY MOUTH EVERYDAY AT BEDTIME    metFORMIN (GLUCOPHAGE) 1,000 mg tablet TAKE 1 TABLET BY MOUTH TWICE A DAY    losartan (COZAAR) 25 mg tablet TAKE 1 TABLET BY MOUTH EVERY DAY    tamsulosin (FLOMAX) 0.4 mg capsule TAKE 1 CAPSULE BY MOUTH EVERYDAY AT BEDTIME    memantine (NAMENDA) 10 mg tablet TAKE 1 TABLET BY MOUTH TWICE A DAY    levothyroxine (SYNTHROID) 112 mcg tablet TAKE 1 TABLET BY MOUTH EVERY DAY    donepeziL (ARICEPT) 10 mg tablet TAKE 1 TABLET BY MOUTH NIGHTLY    gabapentin (NEURONTIN) 100 mg capsule TAKE 1 CAPSULE BY MOUTH NIGHTLY FOR DIABETIC COMPLICATION CAUSING INJURY TO SOME BODY NERVES, NEUROPATHIC PAIN    cholecalciferol (VITAMIN D3) (1000 Units /25 mcg) tablet TAKE 2 TABLETS BY MOUTH DAILY    acetaminophen (TYLENOL) 500 mg tablet TAKE 2 TABLETS BY MOUTH 2 TIMES A DAY. Blood-Glucose Meter monitoring kit Use to check blood sugar three times weekly. lancets misc Use to check blood sugar three times weekly. glucose blood VI test strips (ASCENSIA AUTODISC VI, ONE TOUCH ULTRA TEST VI) strip Use to check blood sugar three times weekly and PRN. carbamide peroxide (DEBROX) 6.5 % otic solution Administer 5 Drops into each ear two (2) times a day. menthol-zinc oxide (CALMOSEPTINE) 0.44-20.6 % oint Apply 1 Each to affected area. Apply thin Layer to Sacral area as needed and after incontinence care   Indications: skin irritation     No current facility-administered medications for this visit. The Plan of Care has been reviewed and updated by the Interdisciplinary Group (IDG) as frequently as the patient condition warrants. Plan of Care by Discipline:    1.  Provider  Ongoing evaluation of treatment interventions for specific disease state, Assessment of medication adverse effects, Reduction of polypharmacy within benefit/burden framework appropriate to age, function and disease state, Determine need for laboratory assessment based on patient disease status , Assess results of laboratory testing and adjust treatment plan as appropriate and Assess for Home Health and order as medically indicated    2. Nursing  Coordination of care with specialty services, Review Health Maintenance with patient and provider; update as appropriate, Education regarding disease state, Education for skin care in patients with limited mobility; with focus on preventing skin breakdown and Provider caregiver / family support for patient's current and changing condition    3. Social Work  Assist in optimizing levels of psychosocial function, Assess patient for caregiver needs to optimize psychosocial function and safety, Provide information on available community resources and Assess current Advance Care Planning documents and make ACP recommendations as appropriate      Plan of Care Orders / Action Items:    Type 2 diabetes - HgA1C 6.9% in June 2022, down from 8.8% in February 2022. Rechecked and is fine at 7.2%. Continue metformin 1000mg BID. Gabapentin 100mg QHS controlling his neuropathic pain; continue. Will attempt to collect microalbumin at next visit; unable to void at last visit. Checking blood sugars approximately once weekly, which have been within normal limits. Will obtain controlled substance agreement & urine toxicology screen at next visit. Acute DVT/On Chronic Anticoagulation - Currently taking eliquis 5mg BID for acute left leg DVT, diagnosed May 2022; no signs of side effects at this time. Recently saw Dr. 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 PSA recheck is 2.0; discussed with daughter by phone plan to reduce eliquis to 2.5mg BID at next visit in accordance with heme-onc's recommendations. Daughter reaffirmed desire not to pursue cancer workup/treatment in alignment with goals of care.   Alzheimer's Disease - Currently maintained on donepezil 10mg daily and namenda 10mg. No disturbing behaviors. Discussed allowing him to do as much for himself as possible; will continue to monitor and consider comprehensive cognitive assessment in the future. Hypertension - Blood pressure under fair control on diltiazem ER 120mg daily and losartan 25mg daily. No changes. Hx of thyroid cancer/postablative hypothyroidism - TSH and FT4 at goal in February 2022; continue levothyroxine 112mcg daily. Recheck in 1 year. Hx of prostate CA - PSA checked February 2022 with PSA of 3.4. Discussed that this may be a benign finding as I have no other results to which to compare this number, but that urological referral/treatment may not align with goals of care. Referral to urology declined. Rechecked per heme-onc's recommendation to determine recommended dose of eliquis; PSA now decreased to 2.0 in October 2022. Vitamin deficiencies - Previously on vit B12 and vit D supplementation from [facility]. Labs checked in February 2022; will discontinue B12 supplementation as this does not appear to be necessary but continue vit D supplementation to prevent deficiency, especially given fall risk. Debility/history of recent fall - Discussed the importance of maintaining as much function as possible. Recent mechanical fall; encouraged using rollator any time he is ambulating. Chronic lumbar back pain - No known injury, has been present for years. Did receive exercises from PT recently for his back, which help when he completes them; also having relief with TENS unit, when it is used. Encouraged frequent movement/ambulation as well, as this appears to help. Using tylenol 2000mg daily. Daughter Stephanie Baez reports that pain has been well-managed on acetaminophen. Advance Care Planning - POST form completed at previous visit and uploaded to media.    Preventative Care - Due for flu and covid-19 vaccines; daughter Stephanie Baez elects to bring patient to local pharmacy to receive these immunizations. Will follow up at next visit also on diabetic eye exam.      Has appt later this month with [facility] ENT for hearing evaluation/hearing aid evaluation. Large quantity of cerumen removed from bilateral ears by this provider using curettage; encouraged nightly use of debrox until ENT appointment to prevent further wax build up. Follow up in 3 months for routine care, sooner PRN.      Health Maintenance   Topic Date Due    MICROALBUMIN Q1  Never done    Eye Exam Retinal or Dilated  Never done    DTaP/Tdap/Td series (1 - Tdap) Never done    Shingrix Vaccine Age 50> (1 of 2) Never done    COVID-19 Vaccine (3 - Booster for Pfizer series) 04/14/2021    Flu Vaccine (1) Never done    Foot Exam Q1  02/18/2023    Lipid Screen  02/18/2023    Medicare Yearly Exam  02/19/2023    Depression Screen  04/15/2023    Pneumococcal 65+ years  Completed         Estimated Visit Frequency:  Every 3 month Provider Visit  Last NP visit: 10/3/22  SW visits PRN

## 2022-12-29 ENCOUNTER — DOCUMENTATION ONLY (OUTPATIENT)
Dept: FAMILY MEDICINE CLINIC | Age: 87
End: 2022-12-29

## 2022-12-29 NOTE — PROGRESS NOTES
Home Based Primary Care  Plan of Care    \[facility]\"" Team Members: Gwendolyn Luna MD; Reyna Lyon NP; Manuela Mcclelland NP; Ted Prince RN; Jennie Tenorio, RN; Gretel Bui RN; SUDHA Pryor  10/7/1931 / [id number]  male    The physician has reviewed and discussed the plan of care with the interdisciplinary group on 01/03/23 and agrees. 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 male who was referred to the [address] [place] [address] program due to taxing effort to seek primary care services in the community. Patient discussed today for 60 day POC review. Advance Care Planning:  Code status: DNR      Primary Decision Maker (Active): Dionisio Emmanuel - Sal[place] - [phone]   No flowsheet data found.     DME/Supplies:  Rolling walker     No Known Allergies    Nutritional Requirements:   Oral with supported meal preparation    Functional/Activity Level:  Ambulatory with assistance of cane, walker, or support of another person (significant impairment)    Safety Measures:   Fall risk and Self-care deficity    Acuity Level Rating: Low    Current Outpatient Medications   Medication Sig    losartan (COZAAR) 25 mg tablet TAKE 1 TABLET BY MOUTH EVERY DAY    levothyroxine (SYNTHROID) 112 mcg tablet TAKE 1 TABLET BY MOUTH EVERY DAY    memantine (NAMENDA) 10 mg tablet TAKE 1 TABLET BY MOUTH TWICE A DAY dilTIAZem ER (CARDIZEM CD) 120 mg capsule TAKE 1 CAPSULE BY MOUTH EVERY DAY    atorvastatin (LIPITOR) 10 mg tablet TAKE 1 TABLET BY MOUTH EVERYDAY AT BEDTIME    metFORMIN (GLUCOPHAGE) 1,000 mg tablet TAKE 1 TABLET BY MOUTH TWICE A DAY    tamsulosin (FLOMAX) 0.4 mg capsule TAKE 1 CAPSULE BY MOUTH EVERYDAY AT BEDTIME    apixaban (Eliquis) 2.5 mg tablet TAKE 1 TABLET BY MOUTH TWO (2) TIMES A DAY. INDICATIONS: BLOOD CLOT IN A DEEP VEIN OF THE EXTREMITIES    donepeziL (ARICEPT) 10 mg tablet TAKE 1 TABLET BY MOUTH NIGHTLY    gabapentin (NEURONTIN) 100 mg capsule TAKE 1 CAPSULE BY MOUTH NIGHTLY FOR DIABETIC COMPLICATION CAUSING INJURY TO SOME BODY NERVES, NEUROPATHIC PAIN    cholecalciferol (VITAMIN D3) (1000 Units /25 mcg) tablet TAKE 2 TABLETS BY MOUTH DAILY    acetaminophen (TYLENOL) 500 mg tablet TAKE 2 TABLETS BY MOUTH 2 TIMES A DAY. Blood-Glucose Meter monitoring kit Use to check blood sugar three times weekly. lancets misc Use to check blood sugar three times weekly. glucose blood VI test strips (ASCENSIA AUTODISC VI, ONE TOUCH ULTRA TEST VI) strip Use to check blood sugar three times weekly and PRN. carbamide peroxide (DEBROX) 6.5 % otic solution Administer 5 Drops into each ear two (2) times a day. menthol-zinc oxide (CALMOSEPTINE) 0.44-20.6 % oint Apply 1 Each to affected area. Apply thin Layer to Sacral area as needed and after incontinence care   Indications: skin irritation     No current facility-administered medications for this visit. The Plan of Care has been reviewed and updated by the Interdisciplinary Group (IDG) as frequently as the patient condition warrants. Plan of Care by Discipline:    1.  Provider  Ongoing evaluation of treatment interventions for specific disease state, Assessment of medication adverse effects, Reduction of polypharmacy within benefit/burden framework appropriate to age, function and disease state, Determine need for laboratory assessment based on patient disease status , Assess results of laboratory testing and adjust treatment plan as appropriate and Assess for Home Health and order as medically indicated    2. Nursing  Coordination of care with specialty services, Review Health Maintenance with patient and provider; update as appropriate, Education regarding disease state, Education for skin care in patients with limited mobility; with focus on preventing skin breakdown and Provider caregiver / family support for patient's current and changing condition    3. Social Work  Assist in optimizing levels of psychosocial function, Assess patient for caregiver needs to optimize psychosocial function and safety, Provide information on available community resources and Assess current Advance Care Planning documents and make ACP recommendations as appropriate      Plan of Care Orders / Action Items:    Type 2 diabetes - HgA1C 6.9% in June 2022, down from 8.8% in February 2022. Rechecked and is fine at 7.2%. Continue metformin 1000mg BID. Gabapentin 100mg QHS controlling his neuropathic pain; continue. Will attempt to collect microalbumin at next visit; unable to void at last visit. Checking blood sugars approximately once weekly, which have been within normal limits. Will obtain controlled substance agreement & urine toxicology screen at next visit. Acute DVT/On Chronic Anticoagulation - Currently taking eliquis 5mg BID for acute left leg DVT, diagnosed May 2022; no signs of side effects at this time. Recently saw Dr. 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 PSA recheck is 2.0; discussed with daughter by phone plan to reduce eliquis to 2.5mg BID at next visit in accordance with heme-onc's recommendations. Daughter reaffirmed desire not to pursue cancer workup/treatment in alignment with goals of care.   Alzheimer's Disease - Currently maintained on donepezil 10mg daily and namenda 10mg. No disturbing behaviors. Discussed allowing him to do as much for himself as possible; will continue to monitor and consider comprehensive cognitive assessment in the future. Hypertension - Blood pressure under fair control on diltiazem ER 120mg daily and losartan 25mg daily. No changes. Hx of thyroid cancer/postablative hypothyroidism - TSH and FT4 at goal in February 2022; continue levothyroxine 112mcg daily. Recheck in 1 year. Hx of prostate CA - PSA checked February 2022 with PSA of 3.4. Discussed that this may be a benign finding as I have no other results to which to compare this number, but that urological referral/treatment may not align with goals of care. Referral to urology declined. Rechecked per heme-onc's recommendation to determine recommended dose of eliquis; PSA now decreased to 2.0 in October 2022. Vitamin deficiencies - Previously on vit B12 and vit D supplementation from [facility]. Labs checked in February 2022; will discontinue B12 supplementation as this does not appear to be necessary but continue vit D supplementation to prevent deficiency, especially given fall risk. Debility/history of recent fall - Discussed the importance of maintaining as much function as possible. Recent mechanical fall; encouraged using rollator any time he is ambulating. Chronic lumbar back pain - No known injury, has been present for years. Did receive exercises from PT recently for his back, which help when he completes them; also having relief with TENS unit, when it is used. Encouraged frequent movement/ambulation as well, as this appears to help. Using tylenol 2000mg daily. Daughter Jvvernonkt Ellison reports that pain has been well-managed on acetaminophen. Advance Care Planning - POST form completed at previous visit and uploaded to media.    Preventative Care - Due for flu and covid-19 vaccines; star Ellison elects to bring patient to local pharmacy to receive these immunizations. Will follow up at next visit also on diabetic eye exam.      Has appt later this month with [facility] ENT for hearing evaluation/hearing aid evaluation. Large quantity of cerumen removed from bilateral ears by this provider using curettage; encouraged nightly use of debrox until ENT appointment to prevent further wax build up. Follow up in 3 months for routine care, sooner PRN.      Health Maintenance   Topic Date Due    DTaP/Tdap/Td series (1 - Tdap) Never done    Shingles Vaccine (1 of 2) Never done    COVID-19 Vaccine (3 - Booster for Pfizer series) 04/14/2021    Flu Vaccine (1) Never done    Lipid Screen  02/18/2023    Medicare Yearly Exam  02/19/2023    Depression Screen  04/15/2023    Pneumococcal 65+ years  Completed         Estimated Visit Frequency:  Every 3 month Provider Visit  Last NP visit: 10/3/22  SW visits PRN

## 2023-01-01 ENCOUNTER — HOME CARE VISIT (OUTPATIENT)
Dept: SCHEDULING | Facility: HOME HEALTH | Age: 88
End: 2023-01-01
Payer: MEDICARE

## 2023-01-01 ENCOUNTER — HOME CARE VISIT (OUTPATIENT)
Dept: HOSPICE | Facility: HOSPICE | Age: 88
End: 2023-01-01
Payer: MEDICARE

## 2023-01-01 ENCOUNTER — HOSPICE ADMISSION (OUTPATIENT)
Dept: HOSPICE | Facility: HOSPICE | Age: 88
End: 2023-01-01
Payer: MEDICARE

## 2023-01-01 VITALS — TEMPERATURE: 99 F | OXYGEN SATURATION: 98 % | RESPIRATION RATE: 24 BRPM | HEART RATE: 104 BPM

## 2023-01-01 VITALS — RESPIRATION RATE: 40 BRPM | HEART RATE: 63 BPM

## 2023-01-01 PROCEDURE — G0156 HHCP-SVS OF AIDE,EA 15 MIN: HCPCS

## 2023-01-01 PROCEDURE — 0651 HSPC ROUTINE HOME CARE

## 2023-01-01 PROCEDURE — HOSPICE MEDICATION HC HH HOSPICE MEDICATION

## 2023-01-01 PROCEDURE — G0300 HHS/HOSPICE OF LPN EA 15 MIN: HCPCS

## 2023-01-01 PROCEDURE — G0299 HHS/HOSPICE OF RN EA 15 MIN: HCPCS

## 2023-01-01 PROCEDURE — 3331090004 HSPC SERVICE INTENSITY ADD-ON

## 2023-01-05 ENCOUNTER — TELEPHONE (OUTPATIENT)
Dept: FAMILY MEDICINE CLINIC | Age: 88
End: 2023-01-05

## 2023-01-05 ENCOUNTER — HOME CARE VISIT (OUTPATIENT)
Dept: SCHEDULING | Facility: HOME HEALTH | Age: 88
End: 2023-01-05
Payer: MEDICARE

## 2023-01-05 VITALS
TEMPERATURE: 98.3 F | DIASTOLIC BLOOD PRESSURE: 92 MMHG | OXYGEN SATURATION: 96 % | HEART RATE: 96 BPM | SYSTOLIC BLOOD PRESSURE: 118 MMHG | RESPIRATION RATE: 22 BRPM

## 2023-01-05 DIAGNOSIS — G30.9 ALZHEIMER'S DISEASE (HCC): Primary | ICD-10-CM

## 2023-01-05 DIAGNOSIS — R53.81 DEBILITY: ICD-10-CM

## 2023-01-05 DIAGNOSIS — F02.80 ALZHEIMER'S DISEASE (HCC): Primary | ICD-10-CM

## 2023-01-05 PROCEDURE — G0299 HHS/HOSPICE OF RN EA 15 MIN: HCPCS

## 2023-01-05 NOTE — TELEPHONE ENCOUNTER
The patient's daughter Elle Brooke left a message on voicemail. The patient is not feeling well and she is requiring some guidance.   # [phone]

## 2023-01-05 NOTE — TELEPHONE ENCOUNTER
Call returned to patient's daughter, Dianne Ross - daughter reports patient has been declining recently, but he was ok until 1/3. Since then, he is weaker, with poor PO intake, only having sips of water, he is unable to sit up in recliner or stand up. Patient's son, who is a family practice MD, visit the patient yesterday, assessed him, and recommended hospice, as he did not find any signs of acute illness that would be causing this decline. I spoke with Margarita Linares, MINDA and PCP and NP agrees that patient has been declining and would benefit from hospice care and support. Call returned to Dianne Ross to provide information above. Daughter named [facility] as preference.

## 2023-01-06 ENCOUNTER — HOME CARE VISIT (OUTPATIENT)
Dept: SCHEDULING | Facility: HOME HEALTH | Age: 88
End: 2023-01-06
Payer: MEDICARE

## 2023-01-06 PROCEDURE — G0299 HHS/HOSPICE OF RN EA 15 MIN: HCPCS

## 2023-01-06 NOTE — HOSPICE
Name: Adams Meade  : 10/7/1931  Age: 80 y.o. Principle Hospice Diagnosis: Alzheimer's Disease   Diagnoses RELATED to the terminal prognosis: hypothyroidism  Unrelated Diagnosis: Type 2 Diabetes, Prostate CA, Thyroid CA, vitamin B12 deficiency, vitamin D deficiency, overweight, DVT, HTN, chronic back pain      Date of Hospice Admission: 2023  Hospice Attending Elected by Patient:  Triston Glass   Primary Care Physician: Yarelis Akbar NP     Admitting RN: Joss PULIDO, RN  Nurse CM: Flor Major  : Katheryn Domingo  : Ronak Deleon DNR: yes, scanned into consents folder      Service: unknown      Home: TBD     Direct Observation: RN presents to home for hospice admission. On arrival, RN greeted by patient's daughter and patient's son in law. Patients' daughter states that patient came to live with her and her  about a year ago due to patients moderate Alzheimer's disease. Patients' daughter states a significant decline for over 24 hours. Patients' son is a doctor and described a significant decline as well. Patients' daughter describes the patient as having fatigue, weakness, and lifelessness. Patient is sleeping all day and is less responsive per patient's daughter. Patient daughter stated that patient was restless yesterday. Patient has not eaten in over 24 hours and has not taken any of his medications. Patient has only had about a cup of water in the last 24 hours. Patients' daughter states patient has been incontinent for years and has help from paid caregivers for baths every day. Hospice philosophy and goals discussed with patients' family. Consents signed. Physical assessment completed. Patient has a blank stare on his face during visit. No obvious signs of pain and only shortness of breath on exertion. Vital signs obtained. Skin assessments completed.  Aspiration precautions reviewed with patients' family and patients' family educated on only giving pills/ food when the patient is awake. DME ordered (O2). Hydromorphone and lorazepam ordered from local pharmacy. The rest of comfort pack medications ordered from [facility] for mail delivery. Supplies ordered. On departure, RN educated family on calling hospice 24/7 with any questions or concerns. Patient sleeping in bed on departure. Palliative Performance Scale: 30%      ER Visits/ Hospitalizations in past year: 1  Onset Date of Hospice Diagnosis:     Summary of Disease Progression Leading to Hospice Diagnosis: Author of note Daryl Jean NP   ASSESSMENT/PLAN:  Below is the assessment and plan developed based on review of pertinent history, physical exam, labs, studies, and medications. 1. Type 2 diabetes mellitus with hyperglycemia, without long-term current use of insulin (HCC)  - MICROALBUMIN, UR, RAND W/ MICROALB/CREAT RATIO; Future  - HEMOGLOBIN A1C WITH EAG; Future  2. Acute deep vein thrombosis (DVT) of other specified vein of left lower extremity (HCC)  - CBC W/O DIFF; Future  - METABOLIC PANEL, BASIC; Future  3. On continuous oral anticoagulation  4. Primary hypertension  - CBC W/O DIFF; Future  - METABOLIC PANEL, BASIC; Future  5. Postablative hypothyroidism  6. Alzheimer's disease ([facility] [address]  7. History of prostate cancer  8. Elevated PSA, less than 10 ng/ml  - PSA, DIAGNOSTIC (PROSTATE SPECIFIC AG); Future     Type 2 diabetes - HgA1C 6.9% in June 2022, down from 8.8% in February 2022. Rechecked today and is fine at 7.2%. Continue metformin 1000mg BID. Gabapentin 100mg QHS controlling his neuropathic pain; continue. Will attempt to collect microalbumin at next visit; unable to void today. Checking blood sugars approximately once weekly, which have been within normal limits. Will obtain controlled substance agreement & urine toxicology screen at next visit.    Acute DVT/On Chronic Anticoagulation - Currently taking eliquis 5mg BID for acute left leg DVT, diagnosed May 2022; no signs of side effects at this time. Recently saw Dr. 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 PSA recheck today is 2.0; discussed with daughter by phone plan to reduce eliquis to 2.5mg BID at next visit in accordance with heme-onc's recommendations. Daughter reaffirmed desire today not to pursue cancer workup/treatment in alignment with goals of care. Alzheimer's Disease - Currently maintained on donepezil 10mg daily and namenda 10mg. No disturbing behaviors. Discussed allowing him to do as much for himself as possible; will continue to monitor and consider comprehensive cognitive assessment in the future. Hypertension - Blood pressure under fair control today on diltiazem ER 120mg daily and losartan 25mg daily. No changes today. Hx of thyroid cancer/postablative hypothyroidism - TSH and FT4 at goal in February 2022; continue levothyroxine 112mcg daily. Recheck in 1 year. Hx of prostate CA - PSA checked February 2022 with PSA of 3.4. Discussed that this may be a benign finding as I have no other results to which to compare this number, but that urological referral/treatment may not align with goals of care. Referral to urology declined. Recheck today per heme-onc's recommendation to determine recommended dose of eliquis; PSA now decreased to 2.0 in October 2022. Vitamin deficiencies - Previously on vit B12 and vit D supplementation from [facility]. Labs checked in February 2022; will discontinue B12 supplementation as this does not appear to be necessary but continue vit D supplementation to prevent deficiency, especially given fall risk. Debility/history of recent fall - Discussed the importance of maintaining as much function as possible. Recent mechanical fall; encouraged using rollator any time he is ambulating. Chronic lumbar back pain - No known injury, has been present for years.  Did receive exercises from PT recently for his back, which help when he completes them; also having relief with TENS unit, when it is used. Encouraged frequent movement/ambulation as well, as this appears to help. Using tylenol 2000mg daily. Daughter Karena Lewis reports that pain has been well-managed on acetaminophen. Advance Care Planning - POST form completed at previous visit and uploaded to media. Preventative Care - Due for flu and covid-19 vaccines; daughter Karena Lewis elects to bring patient to local pharmacy to receive these immunizations. Will follow up at next visit also on diabetic eye exam.      Has appt later this month with [facility] for hearing evaluation/hearing aid evaluation. Large quantity of cerumen removed today from bilateral ears by this provider using curettage; encouraged nightly use of debrox until ENT appointment to prevent further wax build up. Labs drawn today; will call daughter with results & recommendations. Follow up in 3 months for routine care, sooner PRN. Use LCD Guidelines and list features:      ____X____  1. Stage seven or beyond according to the Functional Assessment Staging Scale;  ____X____  2. Unable to ambulate without assistance;  ____X____  3. Unable to dress without assistance;  ____X____  4. Unable to bathe without assistance;  ____X____  5. Urinary and fecal incontinence, intermittent or constant;  ____X____  6. No consistently meaningful verbal communication: stereotypical phrases only or the ability to speak is limited to six or fewer intelligible words. Patients should have had one of the following within the past 12 months:    ________  1. Aspiration pneumonia;  ________  2. Pyelonephritis or other upper urinary tract infection;  ________  3. Septicemia;  ________  4. Decubitus ulcers, multiple, stage 3-4;  ________  5. Fever, recurrent after antibiotics;  ________  6.  Inability to maintain sufficient fluid and calorie intake with 10% weight loss during the            previous six months or serum albumin Note: This section is specific for Alzheimer's Disease            and related disorders, and is not appropriate for other types of dementia, such as multi-           infarct dementia. SPIRITUAL/Social/Emotional/psych: Patient's daughter is MARITZA, and he has been living with her for the last year. Patients' son is a doctor that lives in [place]. Dr. Anders Power on behalf of [facility] contacted, agrees to serve as attending provider for hospice and provided verbal certification of terminal illness with prognosis of 6 months or less life expectancy. Orders for hospice admission, medications and plan of treatment received. Medication reconciliation completed.         Currently this patient has:   Supplemental O2: yes, PRN for respiratory comfort        MEDS:  I have reviewed the patient's medication list with MD and identified the following:  Nonformulary medications: n/a  Unrelated medications: n/a     IDT communication to include MD, , SW, [address] [place] [address] and support team.

## 2023-01-07 ENCOUNTER — HOME CARE VISIT (OUTPATIENT)
Dept: HOSPICE | Facility: HOSPICE | Age: 88
End: 2023-01-07
Payer: MEDICARE

## 2023-01-08 VITALS
HEART RATE: 82 BPM | SYSTOLIC BLOOD PRESSURE: 120 MMHG | DIASTOLIC BLOOD PRESSURE: 79 MMHG | TEMPERATURE: 98.2 F | RESPIRATION RATE: 18 BRPM | OXYGEN SATURATION: 97 %

## 2023-01-09 ENCOUNTER — HOME CARE VISIT (OUTPATIENT)
Dept: HOSPICE | Facility: HOSPICE | Age: 88
End: 2023-01-09
Payer: MEDICARE

## 2023-01-09 ENCOUNTER — HOME CARE VISIT (OUTPATIENT)
Dept: SCHEDULING | Facility: HOME HEALTH | Age: 88
End: 2023-01-09
Payer: MEDICARE

## 2023-01-09 VITALS
TEMPERATURE: 98.8 F | RESPIRATION RATE: 18 BRPM | DIASTOLIC BLOOD PRESSURE: 98 MMHG | OXYGEN SATURATION: 98 % | SYSTOLIC BLOOD PRESSURE: 140 MMHG | HEART RATE: 83 BPM

## 2023-01-09 PROCEDURE — G0299 HHS/HOSPICE OF RN EA 15 MIN: HCPCS

## 2023-01-09 NOTE — HOSPICE
Received patient in [facility] sitting in recliner in the living room with paid caregiver and daughter Heber Sorensen present. Patient appears comfortable with no nonverbal signs of pain or shortness of breath. Patient answers yes and no questions today. Caregiver reports appetite has improved back to his normal amount, stating he ate a good breakfast today. Daughter reports he had a really good day on Saturday as well. The family does want to get a hospital bed but they aren't quite ready for it. Instructed to call hospice when they are ready and we can place the order. NCD explained and daughter Heber Sorensen signed. Sent to consents to be scanned into chart. No refills or supplies requested today. Encouraged family to call hospice 24/7 with any needs or change in condition. Shane Tesfaye understanding and agrees.

## 2023-01-09 NOTE — HOSPICE
Initial visit by [facility] to patient admitted yesterday. Received patient in bed in pajama shirt and brief. Daughter Kentrell Gardner, son Kermit Gitelman, and paid caregiver present. Patient alert but nonverbal during visit. Discussed recent decline and reason for electing hospice. Discussed hospice philosophy and routine for visits. Son Kermit Gitelman is an MD with hospice experience in another state. Family are very familiar with hospice and agree that this is best for their father. Discussed aspiration precautions and possibility of ordering wedge pillow as his adjustable bed doesn't go up enough but family do not want hospital bed. Supplies: mouthswabs and large briefs. Encouraged family to call hospice 24/7 with any needs or change in patient's condition. Maria Antonia Ramirez understanding and agrees.

## 2023-01-10 ENCOUNTER — HOME CARE VISIT (OUTPATIENT)
Dept: HOSPICE | Facility: HOSPICE | Age: 88
End: 2023-01-10
Payer: MEDICARE

## 2023-01-10 PROCEDURE — G0156 HHCP-SVS OF AIDE,EA 15 MIN: HCPCS

## 2023-01-11 NOTE — HOSPICE
initial visit via iPhone to provide comfort and spiritual guidance to the pt, family and CG as they make their journey towards EOL. Introduced spiritual care services to pt's daughter Michael Velazquez; educated on hospice philosophy and the support roles of the team; offered empathic listening processing transition to hospice. Daughter noted prior experience w/ hospice for a brief time when mother . Reflected on family's struggles: daughter noted that the unpredictability of patient's ups and downs and the unknowns of end of life journey enhance family's anxiety as they like to plan ahead. Completed spiritual history and reviewed coping resources: patient is non-Anglican and places value on quality time w/ family. Daughter shared he has shifted from providing for others and being strong for his loved ones to appreciating care from others. Family still processing new normal of hospice routine and welcomes  to callback at a later time to assess needs and offer a visit.

## 2023-01-13 ENCOUNTER — HOME CARE VISIT (OUTPATIENT)
Dept: HOSPICE | Facility: HOSPICE | Age: 88
End: 2023-01-13
Payer: MEDICARE

## 2023-01-16 NOTE — HOSPICE
Received patient in hospital bed in pajamas and brief. Patient did rouse adn partially open his eyes with verbal greeting and touch. Daughter Matteo Byrd and caregiver Adithyamadeleine Lisa present at bedside for visit. Daughter reports a rather drastic decline since Friday of last week. Patient was agitated and wandering in the home. Patient was also reporting discomfort. Daughter gave Haldol as ordered appropriately throughout the weekend and has given dilaudid at times. Today on exam, Pulse was up to 104 bpm and respirations were increased at 24/min. Discussed nonverbal signs of pain and shortness of breath with daughter and caregiver and gave appropriate interventions for both. O2 concentrator at bedside, not currently in use but teaching reinforced. HR irregularly irregular today and may account for some of the discomfort. Hypoactive bowel sounds present, pedal pulses palpable. Last bowel movement yesterday per family. Discussed comfort as primary goal of care and encouraged use of symptom relief medications as needed. Refills: Haldol ordered via [employer] for delivery. Confirmation #[id number]. Supplies: wedge pillow from [facility] and ultrasorb chux from [employer] for delivery. Family requests that SN nursing visits be increased to twice weekly for assessment. RNCM agrees this would be helpful. Encouraged daughter to call hospice 24/7 with any needs or change in patient's condition. Daughter voices understanding and agrees.

## 2023-01-18 ENCOUNTER — HOME CARE VISIT (OUTPATIENT)
Dept: HOSPICE | Facility: HOSPICE | Age: 88
End: 2023-01-18
Payer: MEDICARE

## 2023-01-18 NOTE — HOSPICE
Prn visit to assess change in status. Patient with RR 40 , no other signs of discomfort noted. O2 via nasal cannula place on patient at 2 L for comfort. unable to obtain BP. Patient with mottling of lower extremities, decrease pulse. Call to NP Kiki Alvarado to review patient status, orders to increase dilaudid 2mg every hour as need for sob and pain. Reviewed medication instruction with daughter who verbalize understanding. Reviewed signs of EOL. Patient incontinue of bladder, assisted hired caregiver with incontinent care. RR 30-32 at visit end. Patient placed on daily visit list due to status change and family support.  encourage daughter to call with any question or concerns

## 2023-01-18 NOTE — HOSPICE
Arby Bosworth (10/7/1931) passed peacefully today at home with daughter and son in law present after 2 minutes of auscultation with no heartbeat and no respirations. TOD 1738. Medications wasted per hospice protocol with patient's daughter Bassem Fan serving as witness. [facility]  home to serve needs of patient and family.  home called to  patient from the home. Patients daughter did not need RN to stay in home until  home arrived. Postmortem care completed by CHELITA Morris notified to  DME tomorrow morning. Patients daughter is low bereavement risk. Patients daughter request  and  to reach out to her tomorrow. Hospice staff and Dr. Mariajose Kenney notified via this e-mail of patient's death.